# Patient Record
Sex: MALE | Race: WHITE | NOT HISPANIC OR LATINO | Employment: FULL TIME | URBAN - METROPOLITAN AREA
[De-identification: names, ages, dates, MRNs, and addresses within clinical notes are randomized per-mention and may not be internally consistent; named-entity substitution may affect disease eponyms.]

---

## 2020-06-18 DIAGNOSIS — F41.9 ANXIETY: Primary | ICD-10-CM

## 2020-06-18 RX ORDER — BUPROPION HYDROCHLORIDE 150 MG/1
150 TABLET, EXTENDED RELEASE ORAL 2 TIMES DAILY
Qty: 60 TABLET | Refills: 3 | Status: SHIPPED | OUTPATIENT
Start: 2020-06-18 | End: 2021-04-06 | Stop reason: SDUPTHER

## 2020-06-18 RX ORDER — BUPROPION HYDROCHLORIDE 150 MG/1
150 TABLET, EXTENDED RELEASE ORAL 2 TIMES DAILY
COMMUNITY
End: 2020-06-18 | Stop reason: SDUPTHER

## 2020-07-30 DIAGNOSIS — I10 ESSENTIAL HYPERTENSION: Primary | ICD-10-CM

## 2020-07-30 PROBLEM — R74.8 ELEVATED LIVER ENZYMES: Status: ACTIVE | Noted: 2020-07-30

## 2020-07-30 PROBLEM — J45.40 MODERATE PERSISTENT ASTHMA WITHOUT COMPLICATION: Status: ACTIVE | Noted: 2020-07-30

## 2020-07-30 PROBLEM — D45 POLYCYTHEMIA VERA (HCC): Status: ACTIVE | Noted: 2020-07-30

## 2020-07-30 PROBLEM — Z72.0 TOBACCO USER: Status: ACTIVE | Noted: 2020-07-30

## 2020-07-30 RX ORDER — IRBESARTAN 150 MG/1
TABLET ORAL
Qty: 30 TABLET | Refills: 3 | Status: SHIPPED | OUTPATIENT
Start: 2020-07-30 | End: 2020-12-29 | Stop reason: SDUPTHER

## 2020-12-29 DIAGNOSIS — I10 ESSENTIAL HYPERTENSION: ICD-10-CM

## 2020-12-29 RX ORDER — IRBESARTAN 150 MG/1
150 TABLET ORAL DAILY
Qty: 30 TABLET | Refills: 0 | Status: SHIPPED | OUTPATIENT
Start: 2020-12-29 | End: 2021-04-06 | Stop reason: SDUPTHER

## 2021-04-06 ENCOUNTER — OFFICE VISIT (OUTPATIENT)
Dept: FAMILY MEDICINE CLINIC | Facility: CLINIC | Age: 46
End: 2021-04-06
Payer: COMMERCIAL

## 2021-04-06 VITALS
WEIGHT: 289 LBS | OXYGEN SATURATION: 94 % | DIASTOLIC BLOOD PRESSURE: 82 MMHG | RESPIRATION RATE: 14 BRPM | BODY MASS INDEX: 39.14 KG/M2 | HEART RATE: 70 BPM | TEMPERATURE: 97.8 F | HEIGHT: 72 IN | SYSTOLIC BLOOD PRESSURE: 126 MMHG

## 2021-04-06 DIAGNOSIS — J45.40 MODERATE PERSISTENT ALLERGIC ASTHMA: ICD-10-CM

## 2021-04-06 DIAGNOSIS — Z72.0 TOBACCO USE: ICD-10-CM

## 2021-04-06 DIAGNOSIS — I10 ESSENTIAL HYPERTENSION: ICD-10-CM

## 2021-04-06 DIAGNOSIS — E66.01 CLASS 2 SEVERE OBESITY DUE TO EXCESS CALORIES WITH SERIOUS COMORBIDITY AND BODY MASS INDEX (BMI) OF 39.0 TO 39.9 IN ADULT (HCC): ICD-10-CM

## 2021-04-06 DIAGNOSIS — F41.9 ANXIETY: Primary | ICD-10-CM

## 2021-04-06 PROBLEM — E66.812 CLASS 2 SEVERE OBESITY DUE TO EXCESS CALORIES WITH SERIOUS COMORBIDITY AND BODY MASS INDEX (BMI) OF 39.0 TO 39.9 IN ADULT (HCC): Status: ACTIVE | Noted: 2021-04-06

## 2021-04-06 PROCEDURE — 3725F SCREEN DEPRESSION PERFORMED: CPT | Performed by: FAMILY MEDICINE

## 2021-04-06 PROCEDURE — 99214 OFFICE O/P EST MOD 30 MIN: CPT | Performed by: FAMILY MEDICINE

## 2021-04-06 PROCEDURE — 3008F BODY MASS INDEX DOCD: CPT | Performed by: FAMILY MEDICINE

## 2021-04-06 RX ORDER — ALBUTEROL SULFATE 90 UG/1
2 AEROSOL, METERED RESPIRATORY (INHALATION) EVERY 4 HOURS PRN
COMMUNITY
Start: 2004-06-10 | End: 2021-04-06 | Stop reason: SDUPTHER

## 2021-04-06 RX ORDER — BUPROPION HYDROCHLORIDE 150 MG/1
150 TABLET, EXTENDED RELEASE ORAL 2 TIMES DAILY
Qty: 60 TABLET | Refills: 1 | Status: SHIPPED | OUTPATIENT
Start: 2021-04-06 | End: 2021-07-12

## 2021-04-06 RX ORDER — IRBESARTAN 150 MG/1
150 TABLET ORAL DAILY
Qty: 30 TABLET | Refills: 1 | Status: SHIPPED | OUTPATIENT
Start: 2021-04-06 | End: 2021-06-09 | Stop reason: SDUPTHER

## 2021-04-06 RX ORDER — ALBUTEROL SULFATE 90 UG/1
2 AEROSOL, METERED RESPIRATORY (INHALATION) EVERY 4 HOURS PRN
Qty: 1 INHALER | Refills: 1 | Status: SHIPPED | OUTPATIENT
Start: 2021-04-06 | End: 2022-08-10 | Stop reason: SDUPTHER

## 2021-04-06 NOTE — PROGRESS NOTES
Subjective:      Patient ID: Veronica Graham is a 42 y o  male  Here for follow up of chronic conditions, not seen in 1 year, states he is on last pill for his ibresartan, when COVID-19 hit he stopped going to the doctors  Hypertension-chronic, controlled, compliant with [employer], continues to smoke 1 5 ppd, non compliant with diet or exercise  Anxiety- on wellbutrin, chronic, stable  Tobacco use- 1 5 ppd,tried to stop however has not been able to cut back, was taking wellbutrin once a day instead of twice a day and recently increased it to twice a day and is motivated to stop smoking  Moderate persistent asthma-chronic, did not start breo as that was 100$ and uses albuterol more frequently with onset of spring season, he is aware that smoking makes it worse, probably has COPD-never had spirometry      Past Medical History:   Diagnosis Date    Asthma     Hypertension        Family History   Problem Relation Age of Onset    Lung cancer Father     Diabetes Sister     COPD Sister        Past Surgical History:   Procedure Laterality Date    WISDOM TOOTH EXTRACTION          reports that he has been smoking  He started smoking about 22 years ago  He has a 30 00 pack-year smoking history  He has never used smokeless tobacco  He reports current alcohol use  He reports that he does not use drugs        Current Outpatient Medications:     albuterol (PROVENTIL HFA,VENTOLIN HFA) 90 mcg/act inhaler, Inhale 2 puffs every 4 (four) hours as needed for wheezing or shortness of breath, Disp: 1 Inhaler, Rfl: 1    buPROPion (WELLBUTRIN SR) 150 mg 12 hr tablet, Take 1 tablet (150 mg total) by mouth 2 (two) times a day, Disp: 60 tablet, Rfl: 1    irbesartan (AVAPRO) 150 mg tablet, Take 1 tablet (150 mg total) by mouth daily, Disp: 30 tablet, Rfl: 1    fluticasone-salmeterol (Advair Diskus) 250-50 mcg/dose inhaler, Inhale 1 puff 2 (two) times a day Rinse mouth after use , Disp: 3 Inhaler, Rfl: 3    The following portions of the patient's history were reviewed and updated as appropriate: allergies, current medications, past family history, past medical history, past social history, past surgical history and problem list     Review of Systems   Constitutional: Negative for activity change, chills, diaphoresis, fatigue and fever  HENT: Negative for congestion, ear discharge, ear pain, mouth sores, nosebleeds, postnasal drip, rhinorrhea, sinus pressure, sinus pain, sore throat, tinnitus and voice change  Eyes: Negative for photophobia, pain, discharge, redness and visual disturbance  Respiratory: Positive for wheezing  Negative for shortness of breath and stridor  Cardiovascular: Negative for chest pain and palpitations  Gastrointestinal: Negative for abdominal pain, blood in stool, constipation, diarrhea, nausea and vomiting  Endocrine: Negative for cold intolerance and heat intolerance  Musculoskeletal: Negative for arthralgias, back pain, joint swelling and myalgias  Skin: Negative for pallor and rash  Neurological: Negative for dizziness, tremors, seizures, syncope, speech difficulty, weakness and headaches  Psychiatric/Behavioral: Negative for behavioral problems, decreased concentration, hallucinations and suicidal ideas  The patient is not nervous/anxious  Objective:    /82 (BP Location: Left arm, Patient Position: Sitting, Cuff Size: Adult)   Pulse 70   Temp 97 8 °F (36 6 °C) (Temporal)   Resp 14   Ht 6' (1 829 m)   Wt 131 kg (289 lb)   SpO2 94%   BMI 39 20 kg/m²      Physical Exam  Vitals signs and nursing note reviewed  Constitutional:       Appearance: Normal appearance  He is well-developed  HENT:      Head: Normocephalic and atraumatic  Right Ear: External ear normal       Left Ear: External ear normal       Nose: Nose normal    Eyes:      Conjunctiva/sclera: Conjunctivae normal       Pupils: Pupils are equal, round, and reactive to light     Neck:      Musculoskeletal: Normal range of motion and neck supple  Cardiovascular:      Rate and Rhythm: Normal rate and regular rhythm  Heart sounds: Normal heart sounds  No murmur  No gallop  Pulmonary:      Effort: Pulmonary effort is normal  No respiratory distress  Breath sounds: Decreased air movement present  Examination of the right-upper field reveals decreased breath sounds  Examination of the left-upper field reveals decreased breath sounds  Examination of the right-middle field reveals decreased breath sounds  Examination of the left-middle field reveals decreased breath sounds  Examination of the right-lower field reveals decreased breath sounds  Examination of the left-lower field reveals decreased breath sounds  Decreased breath sounds present  No wheezing or rales  Abdominal:      General: There is no distension  Palpations: Abdomen is soft  Tenderness: There is no abdominal tenderness  Musculoskeletal:         General: No tenderness or deformity  Lymphadenopathy:      Cervical: No cervical adenopathy  Skin:     Coloration: Skin is not pale  Findings: No erythema or rash  Neurological:      General: No focal deficit present  Mental Status: He is alert and oriented to person, place, and time  Mental status is at baseline  Psychiatric:         Mood and Affect: Mood normal          Behavior: Behavior normal            No results found for this or any previous visit (from the past 1008 hour(s))  Assessment/Plan:       Diagnoses and all orders for this visit:    Anxiety  -     buPROPion (WELLBUTRIN SR) 150 mg 12 hr tablet; Take 1 tablet (150 mg total) by mouth 2 (two) times a day    Essential hypertension  -     irbesartan (AVAPRO) 150 mg tablet; Take 1 tablet (150 mg total) by mouth daily  -     CBC and differential; Future  -     Comprehensive metabolic panel; Future  -     Lipid panel; Future  -     TSH, 3rd generation with Free T4 reflex;  Future    Moderate persistent allergic asthma  -     albuterol (PROVENTIL HFA,VENTOLIN HFA) 90 mcg/act inhaler; Inhale 2 puffs every 4 (four) hours as needed for wheezing or shortness of breath  -     fluticasone-salmeterol (Advair Diskus) 250-50 mcg/dose inhaler; Inhale 1 puff 2 (two) times a day Rinse mouth after use  Tobacco use    Class 2 severe obesity due to excess calories with serious comorbidity and body mass index (BMI) of 39 0 to 39 9 in adult [facility])    Other orders  -     Discontinue: albuterol (PROVENTIL HFA,VENTOLIN HFA) 90 mcg/act inhaler; Inhale 2 puffs every 4 (four) hours as needed          BP today:  Vitals:    04/06/21 0832   BP: 126/82   Pulse: 70   Resp: 14   Temp: 97 8 °F (36 6 °C)   SpO2: 94%     Current blood pressure goal based on your age and co-morbidities is 150/90 mm Hg  Currently blood pressure is at goal    Continue taking your current medication,refils giver, check labs  Encouraged less than 2 g of sodium intake daily  Discussed red flag symptoms including intractable throbbing headache, visual changes, blurred vision, tingling numbness of any extremity, neurological signs like facial droop, slurred speech, chest pain  Call 9-1-1 and go to ER if these develop  Patient understood and verbalized understanding  Smoking cessation counseled, continue Wellbutrin  Start Advair daily, discussed rescue inhaler versus albuterol use  Smoking worsens asthma  F/u IN 3 MONTHS, consider spirometry  BMI Counseling: Body mass index is 39 2 kg/m²  The BMI is above normal  Nutrition recommendations include decreasing portion sizes, encouraging healthy choices of fruits and vegetables, decreasing fast food intake, consuming healthier snacks, limiting drinks that contain sugar, moderation in carbohydrate intake and reducing intake of cholesterol  Exercise recommendations include moderate physical activity 150 minutes/week  No pharmacotherapy was ordered           Patient was given opportunity to ask questions and all questions were answered

## 2021-06-09 DIAGNOSIS — I10 ESSENTIAL HYPERTENSION: ICD-10-CM

## 2021-06-09 RX ORDER — IRBESARTAN 150 MG/1
150 TABLET ORAL DAILY
Qty: 30 TABLET | Refills: 1 | Status: SHIPPED | OUTPATIENT
Start: 2021-06-09 | End: 2021-08-13

## 2021-07-04 PROBLEM — Z00.00 ENCOUNTER FOR ANNUAL PHYSICAL EXAM: Status: ACTIVE | Noted: 2021-07-04

## 2021-07-05 PROBLEM — D75.1 POLYCYTHEMIA: Status: ACTIVE | Noted: 2020-07-30

## 2021-07-11 DIAGNOSIS — F41.9 ANXIETY: ICD-10-CM

## 2021-07-12 RX ORDER — BUPROPION HYDROCHLORIDE 150 MG/1
TABLET, EXTENDED RELEASE ORAL
Qty: 60 TABLET | Refills: 1 | Status: SHIPPED | OUTPATIENT
Start: 2021-07-12 | End: 2021-11-19

## 2021-08-13 DIAGNOSIS — I10 ESSENTIAL HYPERTENSION: ICD-10-CM

## 2021-08-13 RX ORDER — IRBESARTAN 150 MG/1
TABLET ORAL
Qty: 30 TABLET | Refills: 1 | Status: SHIPPED | OUTPATIENT
Start: 2021-08-13 | End: 2021-10-18 | Stop reason: SDUPTHER

## 2021-10-18 ENCOUNTER — TELEPHONE (OUTPATIENT)
Dept: FAMILY MEDICINE CLINIC | Facility: CLINIC | Age: 46
End: 2021-10-18

## 2021-10-18 DIAGNOSIS — I10 ESSENTIAL HYPERTENSION: ICD-10-CM

## 2021-10-18 RX ORDER — IRBESARTAN 150 MG/1
150 TABLET ORAL DAILY
Qty: 30 TABLET | Refills: 1 | Status: SHIPPED | OUTPATIENT
Start: 2021-10-18 | End: 2021-12-21

## 2021-11-19 DIAGNOSIS — F41.9 ANXIETY: ICD-10-CM

## 2021-11-19 RX ORDER — BUPROPION HYDROCHLORIDE 150 MG/1
TABLET, EXTENDED RELEASE ORAL
Qty: 60 TABLET | Refills: 1 | Status: SHIPPED | OUTPATIENT
Start: 2021-11-19 | End: 2022-04-01

## 2021-12-22 ENCOUNTER — TELEPHONE (OUTPATIENT)
Dept: FAMILY MEDICINE CLINIC | Facility: CLINIC | Age: 46
End: 2021-12-22

## 2021-12-22 DIAGNOSIS — I10 ESSENTIAL HYPERTENSION: ICD-10-CM

## 2021-12-22 RX ORDER — IRBESARTAN 150 MG/1
150 TABLET ORAL DAILY
Qty: 30 TABLET | Refills: 3 | Status: SHIPPED | OUTPATIENT
Start: 2021-12-22 | End: 2022-07-07 | Stop reason: SDUPTHER

## 2022-04-01 DIAGNOSIS — F41.9 ANXIETY: ICD-10-CM

## 2022-04-01 RX ORDER — BUPROPION HYDROCHLORIDE 150 MG/1
TABLET, EXTENDED RELEASE ORAL
Qty: 60 TABLET | Refills: 1 | Status: SHIPPED | OUTPATIENT
Start: 2022-04-01

## 2022-05-09 ENCOUNTER — HOSPITAL ENCOUNTER (EMERGENCY)
Facility: HOSPITAL | Age: 47
Discharge: HOME | End: 2022-05-09
Attending: EMERGENCY MEDICINE
Payer: COMMERCIAL

## 2022-05-09 VITALS
HEART RATE: 64 BPM | WEIGHT: 300 LBS | BODY MASS INDEX: 40.63 KG/M2 | OXYGEN SATURATION: 97 % | DIASTOLIC BLOOD PRESSURE: 85 MMHG | SYSTOLIC BLOOD PRESSURE: 137 MMHG | HEIGHT: 72 IN | RESPIRATION RATE: 18 BRPM | TEMPERATURE: 98 F

## 2022-05-09 DIAGNOSIS — M54.9 MUSCULOSKELETAL BACK PAIN: Primary | ICD-10-CM

## 2022-05-09 LAB
ALBUMIN SERPL BCP-MCNC: 4.4 G/DL (ref 3.5–5)
ALP SERPL-CCNC: 41 U/L (ref 34–104)
ALT SERPL W P-5'-P-CCNC: 58 U/L (ref 7–52)
ANION GAP SERPL CALCULATED.3IONS-SCNC: 6 MMOL/L (ref 4–13)
AST SERPL W P-5'-P-CCNC: 29 U/L (ref 13–39)
BASOPHILS # BLD AUTO: 0.05 THOUSANDS/ΜL (ref 0–0.1)
BASOPHILS NFR BLD AUTO: 1 % (ref 0–1)
BILIRUB SERPL-MCNC: 1.02 MG/DL (ref 0.2–1)
BILIRUB UR QL STRIP: NEGATIVE
BUN SERPL-MCNC: 14 MG/DL (ref 5–25)
CALCIUM SERPL-MCNC: 9.1 MG/DL (ref 8.4–10.2)
CARDIAC TROPONIN I PNL SERPL HS: 5 NG/L
CHLORIDE SERPL-SCNC: 103 MMOL/L (ref 96–108)
CLARITY UR: CLEAR
CO2 SERPL-SCNC: 31 MMOL/L (ref 21–32)
COLOR UR: YELLOW
CREAT SERPL-MCNC: 0.87 MG/DL (ref 0.6–1.3)
EOSINOPHIL # BLD AUTO: 0.24 THOUSAND/ΜL (ref 0–0.61)
EOSINOPHIL NFR BLD AUTO: 3 % (ref 0–6)
ERYTHROCYTE [DISTWIDTH] IN BLOOD BY AUTOMATED COUNT: 13.1 % (ref 11.6–15.1)
GFR SERPL CREATININE-BSD FRML MDRD: 104 ML/MIN/1.73SQ M
GLUCOSE SERPL-MCNC: 103 MG/DL (ref 65–140)
GLUCOSE UR STRIP-MCNC: NEGATIVE MG/DL
HCT VFR BLD AUTO: 48.8 % (ref 36.5–49.3)
HGB BLD-MCNC: 17.7 G/DL (ref 12–17)
HGB UR QL STRIP.AUTO: NEGATIVE
IMM GRANULOCYTES # BLD AUTO: 0.03 THOUSAND/UL (ref 0–0.2)
IMM GRANULOCYTES NFR BLD AUTO: 0 % (ref 0–2)
KETONES UR STRIP-MCNC: NEGATIVE MG/DL
LEUKOCYTE ESTERASE UR QL STRIP: NEGATIVE
LYMPHOCYTES # BLD AUTO: 1.42 THOUSANDS/ΜL (ref 0.6–4.47)
LYMPHOCYTES NFR BLD AUTO: 18 % (ref 14–44)
MCH RBC QN AUTO: 33.6 PG (ref 26.8–34.3)
MCHC RBC AUTO-ENTMCNC: 36.3 G/DL (ref 31.4–37.4)
MCV RBC AUTO: 93 FL (ref 82–98)
MONOCYTES # BLD AUTO: 0.4 THOUSAND/ΜL (ref 0.17–1.22)
MONOCYTES NFR BLD AUTO: 5 % (ref 4–12)
NEUTROPHILS # BLD AUTO: 5.87 THOUSANDS/ΜL (ref 1.85–7.62)
NEUTS SEG NFR BLD AUTO: 73 % (ref 43–75)
NITRITE UR QL STRIP: NEGATIVE
NRBC BLD AUTO-RTO: 0 /100 WBCS
PH UR STRIP.AUTO: 7.5 [PH]
PLATELET # BLD AUTO: 186 THOUSANDS/UL (ref 149–390)
PMV BLD AUTO: 10.1 FL (ref 8.9–12.7)
POTASSIUM SERPL-SCNC: 4.4 MMOL/L (ref 3.5–5.3)
PROT SERPL-MCNC: 6.9 G/DL (ref 6.4–8.4)
PROT UR STRIP-MCNC: NEGATIVE MG/DL
RBC # BLD AUTO: 5.27 MILLION/UL (ref 3.88–5.62)
SODIUM SERPL-SCNC: 140 MMOL/L (ref 135–147)
SP GR UR STRIP.AUTO: 1.02 (ref 1–1.03)
UROBILINOGEN UR QL STRIP.AUTO: 1 E.U./DL
WBC # BLD AUTO: 8.01 THOUSAND/UL (ref 4.31–10.16)

## 2022-05-09 PROCEDURE — 84484 ASSAY OF TROPONIN QUANT: CPT | Performed by: PHYSICIAN ASSISTANT

## 2022-05-09 PROCEDURE — 93005 ELECTROCARDIOGRAM TRACING: CPT

## 2022-05-09 PROCEDURE — 85025 COMPLETE CBC W/AUTO DIFF WBC: CPT | Performed by: PHYSICIAN ASSISTANT

## 2022-05-09 PROCEDURE — 96365 THER/PROPH/DIAG IV INF INIT: CPT

## 2022-05-09 PROCEDURE — 36415 COLL VENOUS BLD VENIPUNCTURE: CPT | Performed by: PHYSICIAN ASSISTANT

## 2022-05-09 PROCEDURE — 96366 THER/PROPH/DIAG IV INF ADDON: CPT

## 2022-05-09 PROCEDURE — 99284 EMERGENCY DEPT VISIT MOD MDM: CPT

## 2022-05-09 PROCEDURE — 81003 URINALYSIS AUTO W/O SCOPE: CPT | Performed by: PHYSICIAN ASSISTANT

## 2022-05-09 PROCEDURE — 99285 EMERGENCY DEPT VISIT HI MDM: CPT | Performed by: PHYSICIAN ASSISTANT

## 2022-05-09 PROCEDURE — 80053 COMPREHEN METABOLIC PANEL: CPT | Performed by: PHYSICIAN ASSISTANT

## 2022-05-09 RX ORDER — METHOCARBAMOL 500 MG/1
500 TABLET, FILM COATED ORAL 4 TIMES DAILY
Qty: 20 TABLET | Refills: 0 | Status: SHIPPED | OUTPATIENT
Start: 2022-05-09

## 2022-05-09 RX ORDER — NAPROXEN 500 MG/1
500 TABLET ORAL 2 TIMES DAILY WITH MEALS
Qty: 30 TABLET | Refills: 0 | Status: SHIPPED | OUTPATIENT
Start: 2022-05-09

## 2022-05-09 RX ADMIN — SODIUM CHLORIDE, SODIUM LACTATE, POTASSIUM CHLORIDE, AND CALCIUM CHLORIDE 1000 ML: .6; .31; .03; .02 INJECTION, SOLUTION INTRAVENOUS at 12:01

## 2022-05-09 NOTE — DISCHARGE INSTRUCTIONS
Please return to the emergency department for worsening symptoms including chest pain, shortness of breath, dizziness, lightheadedness, fever greater than 103, severe pain, inability to walk, fainting episodes, etc  Please follow-up with your family practice provider as soon as possible  I have sent medications over to your pharmacy to your symptoms  Please take them as prescribed  The methocarbamol can occasionally make you drowsy; please do not take if your operating motor machinery or going to work

## 2022-05-09 NOTE — ED PROVIDER NOTES
History  Chief Complaint   Patient presents with    Flank Pain     pt presents to ed via walk in with left sided flank and back pain that started this AM denies any injury      This is a 79-year-old male with past medical history significant for hypertension, tobacco use, polycythemia, and asthma presenting to the emergency department today for left-sided flank pain that radiates to his left rib  He notes the pain was squeezing and constant  He notes the pain has since dissipated and it is not near as painful as it once was  Began earlier this morning  He denies any fever or chills  No chest pain or shortness of breath  No nausea, vomiting, diarrhea, constipation, abdominal pain, or urinary symptoms  No PE/VTE history  He notes the pain is worse with movement  He has no pleuritic chest pain or shortness of breath  The patient denies other complaints at this time  History provided by:  Patient   used: No    Flank Pain  Pain location:  L flank  Pain quality: squeezing    Pain radiation: left rib cage  Pain severity:  Moderate  Onset quality:  Sudden  Duration: since this AM   Timing:  Constant  Progression:  Resolved  Chronicity:  New  Context: not alcohol use, not eating, not previous surgeries, not recent travel, not sick contacts and not trauma    Relieved by:  None tried  Worsened by: Movement  Ineffective treatments:  None tried  Associated symptoms: no anorexia, no belching, no chest pain, no chills, no constipation, no cough, no diarrhea, no dysuria, no fatigue, no fever, no flatus, no hematuria, no melena, no nausea, no shortness of breath, no sore throat and no vomiting    Risk factors: obesity    Risk factors: has not had multiple surgeries, no NSAID use and not pregnant        Prior to Admission Medications   Prescriptions Last Dose Informant Patient Reported? Taking?    albuterol (PROVENTIL HFA,VENTOLIN HFA) 90 mcg/act inhaler Past Month at Unknown time  No Yes   Sig: Inhale 2 puffs every 4 (four) hours as needed for wheezing or shortness of breath   buPROPion (WELLBUTRIN SR) 150 mg 12 hr tablet 5/9/2022 at Unknown time  No Yes   Sig: take 1 tablet by mouth twice a day   irbesartan (AVAPRO) 150 mg tablet 5/9/2022 at Unknown time  No Yes   Sig: Take 1 tablet (150 mg total) by mouth daily      Facility-Administered Medications: None       Past Medical History:   Diagnosis Date    Asthma     Hypertension        Past Surgical History:   Procedure Laterality Date    WISDOM TOOTH EXTRACTION         Family History   Problem Relation Age of Onset    Lung cancer Father     Diabetes Sister     COPD Sister      I have reviewed and agree with the history as documented  E-Cigarette/Vaping    E-Cigarette Use Never User      E-Cigarette/Vaping Substances     Social History     Tobacco Use    Smoking status: Current Every Day Smoker     Packs/day: 1 50     Years: 20 00     Pack years: 30 00     Start date: 1999    Smokeless tobacco: Never Used   Vaping Use    Vaping Use: Never used   Substance Use Topics    Alcohol use: Yes     Comment: heavy     Drug use: Never       Review of Systems   Constitutional: Negative for appetite change, chills, diaphoresis, fatigue and fever  HENT: Negative for sore throat  Eyes: Negative for visual disturbance  Respiratory: Negative for cough, chest tightness, shortness of breath and wheezing  Cardiovascular: Negative for chest pain, palpitations and leg swelling  Gastrointestinal: Negative for abdominal pain, anorexia, constipation, diarrhea, flatus, melena, nausea and vomiting  Genitourinary: Positive for flank pain  Negative for dysuria, hematuria, testicular pain and urgency  Musculoskeletal: Positive for back pain (left-sided)  Negative for neck pain and neck stiffness  Skin: Negative for rash and wound  Neurological: Negative for dizziness, seizures, syncope, weakness, light-headedness, numbness and headaches  Psychiatric/Behavioral: Negative for confusion  All other systems reviewed and are negative  Physical Exam  Physical Exam  Vitals and nursing note reviewed  Constitutional:       General: He is not in acute distress  Appearance: Normal appearance  He is obese  He is not ill-appearing, toxic-appearing or diaphoretic  HENT:      Head: Normocephalic and atraumatic  Nose: Nose normal  No congestion or rhinorrhea  Mouth/Throat:      Mouth: Mucous membranes are moist       Pharynx: No oropharyngeal exudate or posterior oropharyngeal erythema  Eyes:      General: No scleral icterus  Right eye: No discharge  Left eye: No discharge  Conjunctiva/sclera: Conjunctivae normal    Neck:      Comments: Non meningeal  Cardiovascular:      Rate and Rhythm: Normal rate and regular rhythm  Pulses: Normal pulses  Heart sounds: Normal heart sounds  No murmur heard  No friction rub  No gallop  Comments: Regular rate rhythm with no murmurs, rubs, or gallops  Pulmonary:      Effort: Pulmonary effort is normal  No respiratory distress  Breath sounds: Normal breath sounds  No stridor  No wheezing, rhonchi or rales  Comments: Clear to auscultation bilaterally without adventitious breath sounds  Chest:      Chest wall: No tenderness  Abdominal:      General: Abdomen is flat  There is no distension  Palpations: Abdomen is soft  Tenderness: There is no abdominal tenderness  There is no right CVA tenderness, left CVA tenderness, guarding or rebound  Comments: Soft, nontender, nondistended, and without organomegaly  No CVA tenderness bilaterally   Musculoskeletal:         General: Normal range of motion  Cervical back: Normal range of motion  No rigidity  Right lower leg: No edema  Left lower leg: No edema  Comments:  The patient does have some tenderness to palpation to his thoracic paraspinal musculature; appears to have some semblance of muscle spasm associated with it  Normal range of motion of bilateral upper and lower extremities  No tenderness to palpation to midline cervical, thoracic, or lumbar spines; no step-offs or deformities   Skin:     General: Skin is warm and dry  Capillary Refill: Capillary refill takes less than 2 seconds  Coloration: Skin is not jaundiced or pale  Neurological:      General: No focal deficit present  Mental Status: He is alert and oriented to person, place, and time  Mental status is at baseline        Comments: 5/5 strength in bilateral upper and lower extremities  Normal sensation of bilateral upper and lower extremities   Psychiatric:         Mood and Affect: Mood normal          Behavior: Behavior normal          Vital Signs  ED Triage Vitals [05/09/22 1041]   Temperature Pulse Respirations Blood Pressure SpO2   98 °F (36 7 °C) 76 18 156/93 96 %      Temp Source Heart Rate Source Patient Position - Orthostatic VS BP Location FiO2 (%)   Oral Monitor Sitting Left arm --      Pain Score       3           Vitals:    05/09/22 1041 05/09/22 1340   BP: 156/93 137/85   Pulse: 76 64   Patient Position - Orthostatic VS: Sitting Lying         Visual Acuity      ED Medications  Medications   lactated ringers bolus 1,000 mL (0 mL Intravenous Stopped 5/9/22 1415)       Diagnostic Studies  Results Reviewed     Procedure Component Value Units Date/Time    UA w Reflex to Microscopic w Reflex to Culture [[id number]]  (Normal) Collected: 05/09/22 1348    Lab Status: Final result Specimen: Urine, Clean Catch Updated: 05/09/22 1355     Color, UA Yellow     Clarity, UA Clear     Specific [place], UA 1 020     pH, UA 7 5     Leukocytes, UA Negative     Nitrite, UA Negative     Protein, UA Negative mg/dl      Glucose, UA Negative mg/dl      Ketones, UA Negative mg/dl      Urobilinogen, UA 1 0 E U /dl      Bilirubin, UA Negative     Blood, UA Negative    HS Troponin 0hr (reflex protocol) [[id number]]  (Normal) Collected: 05/09/22 1152    Lab Status: Final result Specimen: Blood from Arm, Left Updated: 05/09/22 1220     hs TnI 0hr 5 ng/L     Comprehensive metabolic panel [[id number]]  (Abnormal) Collected: 05/09/22 1152    Lab Status: Final result Specimen: Blood from Arm, Left Updated: 05/09/22 1215     Sodium 140 mmol/L      Potassium 4 4 mmol/L      Chloride 103 mmol/L      CO2 31 mmol/L      ANION GAP 6 mmol/L      BUN 14 mg/dL      Creatinine 0 87 mg/dL      Glucose 103 mg/dL      Calcium 9 1 mg/dL      AST 29 U/L      ALT 58 U/L      Alkaline Phosphatase 41 U/L      Total Protein 6 9 g/dL      Albumin 4 4 g/dL      Total Bilirubin 1 02 mg/dL      eGFR 104 ml/min/1 73sq m     Narrative:      National Kidney Disease Foundation guidelines for Chronic Kidney Disease (CKD):     Stage 1 with normal or high GFR (GFR > 90 mL/min/1 73 square meters)    Stage 2 Mild CKD (GFR = 60-89 mL/min/1 73 square meters)    Stage 3A Moderate CKD (GFR = 45-59 mL/min/1 73 square meters)    Stage 3B Moderate CKD (GFR = 30-44 mL/min/1 73 square meters)    Stage 4 Severe CKD (GFR = 15-29 mL/min/1 73 square meters)    Stage 5 End Stage CKD (GFR <15 mL/min/1 73 square meters)  Note: GFR calculation is accurate only with a steady state creatinine    CBC and differential [[id number]]  (Abnormal) Collected: 05/09/22 1152    Lab Status: Final result Specimen: Blood from Arm, Left Updated: 05/09/22 1156     WBC 8 01 Thousand/uL      RBC 5 27 Million/uL      Hemoglobin 17 7 g/dL      Hematocrit 48 8 %      MCV 93 fL      MCH 33 6 pg      MCHC 36 3 g/dL      RDW 13 1 %      MPV 10 1 fL      Platelets 424 Thousands/uL      nRBC 0 /100 WBCs      Neutrophils Relative 73 %      Immat GRANS % 0 %      Lymphocytes Relative 18 %      Monocytes Relative 5 %      Eosinophils Relative 3 %      Basophils Relative 1 %      Neutrophils Absolute 5 87 Thousands/µL      Immature Grans Absolute 0 03 Thousand/uL      Lymphocytes Absolute 1 42 Thousands/µL Monocytes Absolute 0 40 Thousand/µL      Eosinophils Absolute 0 24 Thousand/µL      Basophils Absolute 0 05 Thousands/µL                  No orders to display              Procedures  ECG 12 Lead Documentation Only    Date/Time: 5/9/2022 11:52 AM  Performed by: Joss Tompkins PA-C  Authorized by: Joss Tompkins PA-C     Indications / Diagnosis:  Back Pain  Patient location:  ED  Interpretation:     Interpretation: normal    Rate:     ECG rate:  69    ECG rate assessment: normal    Rhythm:     Rhythm: sinus rhythm    Ectopy:     Ectopy: none    QRS:     QRS axis:  Left  ST segments:     ST segments:  Normal  T waves:     T waves: non-specific    Comments:      Normal sinus rhythm with a rate of 69 without any ST segment changes or signs of ischemia  Left axis deviation  No ectopy  Nonspecific T-wave inversions             ED Course  ED Course as of 05/09/22 1928   Mon May 09, 2022   Felix KEYS [place] 94 PERC and Wells both 0  Will defer D-Dimer  Will continue to monitor  1250 hs TnI 0hr: 5  Patient refusing further blood draws  Will DC additional troponin testing  I did discuss negative consequences with patient and he is able to tell them back to me and still wishes not to have further troponin testing  I believe he has capacity to make this decision  Will DC additional troponins  Will continue to monitor  1357 Workup is benign  Will DC on muscle relaxers  Will continue to monitor                         PERC Rule for PE      Most Recent Value   PERC Rule for PE    Age >=50 0 Filed at: 05/09/2022 1246   HR >=100 0 Filed at: 05/09/2022 1246   O2 Sat on room air < 95% 0 Filed at: 05/09/2022 1246   History of PE or DVT 0 Filed at: 05/09/2022 1246   Recent trauma or surgery 0 Filed at: 05/09/2022 1246   Hemoptysis 0 Filed at: 05/09/2022 1246   Exogenous estrogen 0 Filed at: 05/09/2022 1246   Unilateral leg swelling 0 Filed at: 05/09/2022 1246   [address] for PE Results 0 Filed at: 05/09/2022 1246              SBIRT 22yo+      Most Recent Value   SBIRT (22 yo +)    In order to provide better care to our patients, we are screening all of our patients for alcohol and drug use  Would it be okay to ask you these screening questions? No Filed at: 05/09/2022 1155          Wells' Criteria for PE      Most Recent Value   Wells' Criteria for PE    Clinical signs and symptoms of DVT 0 Filed at: 05/09/2022 1246   PE is primary diagnosis or equally likely 0 Filed at: 05/09/2022 1246   HR >100 0 Filed at: 05/09/2022 1246   Immobilization at least 3 days or Surgery in the previous 4 weeks 0 Filed at: 05/09/2022 1246   Previous, objectively diagnosed PE or DVT 0 Filed at: 05/09/2022 1246   Hemoptysis 0 Filed at: 05/09/2022 1246   Malignancy with treatment within 6 months or palliative 0 Filed at: 05/09/2022 1246   Wells' Criteria Total 0 Filed at: 05/09/2022 1246                MDM  Number of Diagnoses or Management Options  Musculoskeletal back pain: new and requires workup  Diagnosis management comments: This is a 72-year-old male presenting to the emergency department today for left-sided flank pain and back pain  Ongoing since this morning but the patient notes it is much improved now that he is here in the emergency department  He has no chest pain or shortness of breath  He has no fever or chills  No pleuritic chest pain  No urinary symptoms  Perc is 0  Lurene Xenia is also 0  No indication for D-dimer at this time given low probability of PE  Lab workup grossly within normal limits  Initial troponin was 5 and patient is refusing additional troponin testing  EKG shows normal sinus rhythm with a rate of 69 without any ST segment changes or signs of ischemia  There is evidence of nonspecific T-wave changes  Negative UA  On physical examination, the patient does have thoracic paraspinal musculature tenderness to palpation on the left; I suspect musculoskeletal origin for the patient's pain in the setting of benign lab workup    The patient is stable for discharge at this time  Strict return precautions were given  Robaxin and naproxen sent to the patient's pharmacy  The patient is aware not to drive or go to work while taking Robaxin as it can make him drowsy  Strict return precautions were given  Recommend PCP follow-up as soon as possible  The patient and/or patient's proxy verify their understanding and agree to the plan at this time  All questions answered to the patient and/or their proxy's satisfaction  All labs reviewed and utilized in the medical decision making process  All radiology studies independently viewed by me and interpreted by the radiologist  Portions of the record may have been created with voice recognition software   Occasional wrong word or "sound a like" substitutions may have occurred due to the inherent limitations of voice recognition software   Read the chart carefully and recognize, using context, where substitutions have occurred  Amount and/or Complexity of Data Reviewed  Clinical lab tests: ordered and reviewed  Tests in the radiology section of CPT®: ordered and reviewed  Independent visualization of images, tracings, or specimens: yes (EKG)    Patient Progress  Patient progress: stable      Disposition  Final diagnoses:   Musculoskeletal back pain     Time reflects when diagnosis was documented in both MDM as applicable and the Disposition within this note     Time User Action Codes Description Comment    5/9/2022  2:03 PM Tyesha Wilson Add [M54 9] Musculoskeletal back pain       ED Disposition     ED Disposition Condition Date/Time Comment    Discharge Stable Mon May 9, 2022 [address] discharge to home/self care              Follow-up Information     Follow up With Specialties Details Why Contact Info Additional Information    Joanie Pagan MD Family Medicine Schedule an appointment as soon as possible for a visit   [address]  [place] [address] Emergency Department Emergency Medicine Go to  If symptoms worsen [address] [id number]  [facility] Emergency Department, [address] [place], [address]          Discharge Medication List as of 5/9/2022  2:06 PM      START taking these medications    Details   methocarbamol (ROBAXIN) 500 mg tablet Take 1 tablet (500 mg total) by mouth 4 (four) times a day, Starting Mon 5/9/2022, Normal      naproxen (Naprosyn) 500 mg tablet Take 1 tablet (500 mg total) by mouth 2 (two) times a day with meals, Starting Mon 5/9/2022, Normal         CONTINUE these medications which have NOT CHANGED    Details   albuterol (PROVENTIL HFA,VENTOLIN HFA) 90 mcg/act inhaler Inhale 2 puffs every 4 (four) hours as needed for wheezing or shortness of breath, Starting Tue 4/6/2021, Normal      buPROPion (WELLBUTRIN SR) 150 mg 12 hr tablet take 1 tablet by mouth twice a day, Normal      irbesartan (AVAPRO) 150 mg tablet Take 1 tablet (150 mg total) by mouth daily, Starting Wed 12/22/2021, Normal             No discharge procedures on file      PDMP Review     None          ED Provider  Electronically Signed by           Jeannette Lizarraga PA-C  05/09/22 1941

## 2022-05-09 NOTE — Clinical Note
Haydee Siegel was seen and treated in our emergency department on 5/9/2022  Diagnosis:     Xiomara Rodriguez  is off the rest of the shift today  He may return on this date: The patient is to perform light duty on May 10, 2022 and May 11, 2022  Please call questions or concerns  If you have any questions or concerns, please don't hesitate to call        Jennifer Patton PA-C    ______________________________           _______________          _______________  Hospital Representative                              Date                                Time

## 2022-05-11 LAB
ATRIAL RATE: 68 BPM
P AXIS: 50 DEGREES
PR INTERVAL: 157 MS
QRS AXIS: -86 DEGREES
QRSD INTERVAL: 103 MS
QT INTERVAL: 397 MS
QTC INTERVAL: 426 MS
T WAVE AXIS: 29 DEGREES
VENTRICULAR RATE: 69 BPM

## 2022-05-11 PROCEDURE — 93010 ELECTROCARDIOGRAM REPORT: CPT | Performed by: INTERNAL MEDICINE

## 2022-07-07 ENCOUNTER — TELEPHONE (OUTPATIENT)
Dept: FAMILY MEDICINE CLINIC | Facility: CLINIC | Age: 47
End: 2022-07-07

## 2022-07-07 DIAGNOSIS — I10 ESSENTIAL HYPERTENSION: ICD-10-CM

## 2022-07-07 RX ORDER — IRBESARTAN 150 MG/1
150 TABLET ORAL DAILY
Qty: 30 TABLET | Refills: 1 | Status: SHIPPED | OUTPATIENT
Start: 2022-07-07 | End: 2022-08-10 | Stop reason: SDUPTHER

## 2022-07-07 NOTE — TELEPHONE ENCOUNTER
Needs a refill for:  Irbesartan 150mg, 1x aday    (30 day)    [address]      Patient ph # [phone]

## 2022-08-03 ENCOUNTER — RA CDI HCC (OUTPATIENT)
Dept: OTHER | Facility: HOSPITAL | Age: 47
End: 2022-08-03

## 2022-08-03 NOTE — PROGRESS NOTES
[facility]  coding opportunities          Chart Reviewed number of suggestions sent to Provider: 2   [id number] [phone]  [id number]    Patients Insurance        Commercial Insurance: [employer]

## 2022-08-10 ENCOUNTER — OFFICE VISIT (OUTPATIENT)
Dept: FAMILY MEDICINE CLINIC | Facility: CLINIC | Age: 47
End: 2022-08-10
Payer: COMMERCIAL

## 2022-08-10 VITALS
TEMPERATURE: 97.9 F | BODY MASS INDEX: 40.23 KG/M2 | WEIGHT: 297 LBS | OXYGEN SATURATION: 94 % | DIASTOLIC BLOOD PRESSURE: 84 MMHG | HEART RATE: 80 BPM | HEIGHT: 72 IN | RESPIRATION RATE: 18 BRPM | SYSTOLIC BLOOD PRESSURE: 124 MMHG

## 2022-08-10 DIAGNOSIS — Z12.11 SCREENING FOR COLON CANCER: ICD-10-CM

## 2022-08-10 DIAGNOSIS — Z00.00 ENCOUNTER FOR ANNUAL PHYSICAL EXAM: Primary | ICD-10-CM

## 2022-08-10 DIAGNOSIS — L91.8 SKIN TAGS, MULTIPLE ACQUIRED: ICD-10-CM

## 2022-08-10 DIAGNOSIS — E66.01 MORBID OBESITY DUE TO EXCESS CALORIES (HCC): ICD-10-CM

## 2022-08-10 DIAGNOSIS — D75.1 POLYCYTHEMIA: ICD-10-CM

## 2022-08-10 DIAGNOSIS — Z72.0 TOBACCO USE: ICD-10-CM

## 2022-08-10 DIAGNOSIS — J45.40 MODERATE PERSISTENT ALLERGIC ASTHMA: ICD-10-CM

## 2022-08-10 DIAGNOSIS — I10 ESSENTIAL HYPERTENSION: ICD-10-CM

## 2022-08-10 DIAGNOSIS — Z13.6 SCREENING FOR CARDIOVASCULAR CONDITION: ICD-10-CM

## 2022-08-10 PROCEDURE — 3725F SCREEN DEPRESSION PERFORMED: CPT | Performed by: FAMILY MEDICINE

## 2022-08-10 PROCEDURE — 99396 PREV VISIT EST AGE 40-64: CPT | Performed by: FAMILY MEDICINE

## 2022-08-10 PROCEDURE — 99213 OFFICE O/P EST LOW 20 MIN: CPT | Performed by: FAMILY MEDICINE

## 2022-08-10 RX ORDER — IRBESARTAN 150 MG/1
150 TABLET ORAL DAILY
Qty: 90 TABLET | Refills: 2 | Status: SHIPPED | OUTPATIENT
Start: 2022-08-10

## 2022-08-10 RX ORDER — ALBUTEROL SULFATE 90 UG/1
2 AEROSOL, METERED RESPIRATORY (INHALATION) EVERY 4 HOURS PRN
Qty: 18 G | Refills: 3 | Status: SHIPPED | OUTPATIENT
Start: 2022-08-10

## 2022-08-10 NOTE — PATIENT INSTRUCTIONS
Wellness Visit for Adults   AMBULATORY CARE:   A wellness visit  is when you see your healthcare provider to get screened for health problems  Your healthcare provider will also give you advice on how to stay healthy  Write down your questions so you remember to ask them  Ask your healthcare provider how often you should have a wellness visit  What happens at a wellness visit:  Your healthcare provider will ask about your health, and your family history of health problems  This includes high blood pressure, heart disease, and cancer  He or she will ask if you have symptoms that concern you, if you smoke, and about your mood  You may also be asked about your intake of medicines, supplements, food, and alcohol  Any of the following may be done: Your weight  will be checked  Your height may also be checked so your body mass index (BMI) can be calculated  Your BMI shows if you are at a healthy weight  Your blood pressure  and heart rate will be checked  Your temperature may also be checked  Blood and urine tests  may be done  Blood tests may be done to check your cholesterol levels  Abnormal cholesterol levels increase your risk for heart disease and stroke  You may also need a blood or urine test to check for diabetes if you are at increased risk  Urine tests may be done to look for signs of an infection or kidney disease  A physical exam  includes checking your heartbeat and lungs with a stethoscope  Your healthcare provider may also check your skin to look for sun damage  Screening tests  may be recommended  A screening test is done to check for diseases that may not cause symptoms  The screening tests you may need depend on your age, gender, family history, and lifestyle habits  For example, colorectal screening may be recommended if you are 48years old or older  Screening tests you need if you are a woman:   A Pap smear  is used to screen for cervical cancer   Pap smears are usually done every 3 to 5 years depending on your age  You may need them more often if you have had abnormal Pap smear test results in the past  Ask your healthcare provider how often you should have a Pap smear  A mammogram  is an x-ray of your breasts to screen for breast cancer  Experts recommend mammograms every 2 years starting at age 48 years  You may need a mammogram at age 52 years or younger if you have an increased risk for breast cancer  Talk to your healthcare provider about when you should start having mammograms and how often you need them  Vaccines you may need:   Get an influenza vaccine  every year  The influenza vaccine protects you from the flu  Several types of viruses cause the flu  The viruses change over time, so new vaccines are made each year  Get a tetanus-diphtheria (Td) booster vaccine  every 10 years  This vaccine protects you against tetanus and diphtheria  Tetanus is a severe infection that may cause painful muscle spasms and lockjaw  Diphtheria is a severe bacterial infection that causes a thick covering in the back of your mouth and throat  Get a human papillomavirus (HPV) vaccine  if you are female and aged 23 to 32 or male 23 to 24 and never received it  This vaccine protects you from HPV infection  HPV is the most common infection spread by sexual contact  HPV may also cause vaginal, penile, and anal cancers  Get a pneumococcal vaccine  if you are aged 72 years or older  The pneumococcal vaccine is an injection given to protect you from pneumococcal disease  Pneumococcal disease is an infection caused by pneumococcal bacteria  The infection may cause pneumonia, meningitis, or an ear infection  Get a shingles vaccine  if you are 60 or older, even if you have had shingles before  The shingles vaccine is an injection to protect you from the varicella-zoster virus  This is the same virus that causes chickenpox   Shingles is a painful rash that develops in people who had chickenpox or have been exposed to the virus  How to eat healthy:  My Plate is a model for planning healthy meals  It shows the types and amounts of foods that should go on your plate  Fruits and vegetables make up about half of your plate, and grains and protein make up the other half  A serving of dairy is included on the side of your plate  The amount of calories and serving sizes you need depends on your age, gender, weight, and height  Examples of healthy foods are listed below:  Eat a variety of vegetables  such as dark green, red, and orange vegetables  You can also include canned vegetables low in sodium (salt) and frozen vegetables without added butter or sauces  Eat a variety of fresh fruits , canned fruit in 100% juice, frozen fruit, and dried fruit  Include whole grains  At least half of the grains you eat should be whole grains  Examples include whole-wheat bread, wheat pasta, brown rice, and whole-grain cereals such as oatmeal     Eat a variety of protein foods such as seafood (fish and shellfish), lean meat, and poultry without skin (turkey and chicken)  Examples of lean meats include pork leg, shoulder, or tenderloin, and beef round, sirloin, tenderloin, and extra lean ground beef  Other protein foods include eggs and egg substitutes, beans, peas, soy products, nuts, and seeds  Choose low-fat dairy products such as skim or 1% milk or low-fat yogurt, cheese, and cottage cheese  Limit unhealthy fats  such as butter, hard margarine, and shortening  Exercise:  Exercise at least 30 minutes per day on most days of the week  Some examples of exercise include walking, biking, dancing, and swimming  You can also fit in more physical activity by taking the stairs instead of the elevator or parking farther away from stores  Include muscle strengthening activities 2 days each week  Regular exercise provides many health benefits   It helps you manage your weight, and decreases your risk for type 2 diabetes, heart disease, stroke, and high blood pressure  Exercise can also help improve your mood  Ask your healthcare provider about the best exercise plan for you  General health and safety guidelines:   Do not smoke  Nicotine and other chemicals in cigarettes and cigars can cause lung damage  Ask your healthcare provider for information if you currently smoke and need help to quit  E-cigarettes or smokeless tobacco still contain nicotine  Talk to your healthcare provider before you use these products  Limit alcohol  A drink of alcohol is 12 ounces of beer, 5 ounces of wine, or 1½ ounces of liquor  Lose weight, if needed  Being overweight increases your risk of certain health conditions  These include heart disease, high blood pressure, type 2 diabetes, and certain types of cancer  Protect your skin  Do not sunbathe or use tanning beds  Use sunscreen with a SPF 15 or higher  Apply sunscreen at least 15 minutes before you go outside  Reapply sunscreen every 2 hours  Wear protective clothing, hats, and sunglasses when you are outside  Drive safely  Always wear your seatbelt  Make sure everyone in your car wears a seatbelt  A seatbelt can save your life if you are in an accident  Do not use your cell phone when you are driving  This could distract you and cause an accident  Pull over if you need to make a call or send a text message  Practice safe sex  Use latex condoms if are sexually active and have more than one partner  Your healthcare provider may recommend screening tests for sexually transmitted infections (STIs)  Wear helmets, lifejackets, and protective gear  Always wear a helmet when you ride a bike or motorcycle, go skiing, or play sports that could cause a head injury  Wear protective equipment when you play sports  Wear a lifejacket when you are on a boat or doing water sports      © Copyright [employer] 2022 Information is for End User's use only and may not be sold, redistributed or otherwise used for commercial purposes  All illustrations and images included in CareNotes® are the copyrighted property of A D A M , Inc  or Saskia Jaramillo  The above information is an  only  It is not intended as medical advice for individual conditions or treatments  Talk to your doctor, nurse or pharmacist before following any medical regimen to see if it is safe and effective for you

## 2022-08-10 NOTE — ASSESSMENT & PLAN NOTE
CPE done  Pt to check Tdap status  Declined COVID vaccines  Will check lipids and cologuard  Pt advised to follow a well balanced, heart healthy diet and to exercise on a regular basis  Pt told to decrease ETOH and tob use

## 2022-08-10 NOTE — ASSESSMENT & PLAN NOTE
BP good  Continue irbesartan 150 mg qd  Pt advised to continue low Na diet and to exercise on a regular basis

## 2022-08-10 NOTE — PROGRESS NOTES
BMI Counseling: Body mass index is 40 28 kg/m²  The BMI is above normal  Nutrition recommendations include decreasing portion sizes, encouraging healthy choices of fruits and vegetables, consuming healthier snacks and moderation in carbohydrate intake  Exercise recommendations include exercising 3-5 times per week  No pharmacotherapy was ordered  Rationale for BMI follow-up plan is due to patient being overweight or obese  Depression Screening and Follow-up Plan: Patient was screened for depression during today's encounter  They screened negative with a PHQ-2 score of 0  Assessment/Plan:         Problem List Items Addressed This Visit        Respiratory    Moderate persistent allergic asthma    Relevant Medications    albuterol (PROVENTIL HFA,VENTOLIN HFA) 90 mcg/act inhaler       Cardiovascular and Mediastinum    Essential hypertension     BP good  Continue irbesartan 150 mg qd  Pt advised to continue low Na diet and to exercise on a regular basis  Relevant Medications    irbesartan (AVAPRO) 150 mg tablet       Musculoskeletal and Integument    Skin tags, multiple acquired     Will refer to Derm  Relevant Orders    Ambulatory Referral to Dermatology       Other    Tobacco use     Pt smokes 1 5 PPD  Discussed quitting and chantix  Pt will consider  Polycythemia     Hgb 17 7 in May 2022 and likely due to smoking  Morbid obesity due to excess calories (HCC)     Discussed smaller portions, healthy snacks, and regular exercise  Encounter for annual physical exam - Primary     CPE done  Pt to check Tdap status  Declined COVID vaccines  Will check lipids and cologuard  Pt advised to follow a well balanced, heart healthy diet and to exercise on a regular basis  Pt told to decrease ETOH and tob use              Other Visit Diagnoses     Screening for cardiovascular condition        Relevant Orders    Lipid panel    Screening for colon cancer        Relevant Orders Tara            Subjective:      Patient ID: Maryse Montesinos is a 41 y o  male  Pt here for physical  Doing ok  No cp/sob  No headaches  No abd pain  BP up at times  No regular exercise  Smokes 1 5 PPD  Drinks ETOH on regular basis  Unsure of last Tdap  Hasn't had COVID vaccines  The following portions of the patient's history were reviewed and updated as appropriate:   Past Medical History:  He has a past medical history of Asthma and Hypertension  ,  _______________________________________________________________________  Medical Problems:  does not have any pertinent problems on file ,  _______________________________________________________________________  Past Surgical History:   has a past surgical history that includes [place] tooth extraction  ,  _______________________________________________________________________  Family History:  family history includes COPD in his sister; Diabetes in his sister; Lung cancer in his father ,  _______________________________________________________________________  Social History:   reports that he has been smoking  He started smoking about 23 years ago  He has a 30 00 pack-year smoking history  He has never used smokeless tobacco  He reports current alcohol use  He reports that he does not use drugs  ,  _______________________________________________________________________  Allergies:  has No Known Allergies     _______________________________________________________________________  Current Outpatient Medications   Medication Sig Dispense Refill    albuterol (PROVENTIL HFA,VENTOLIN HFA) 90 mcg/act inhaler Inhale 2 puffs every 4 (four) hours as needed for wheezing or shortness of breath 18 g 3    irbesartan (AVAPRO) 150 mg tablet Take 1 tablet (150 mg total) by mouth daily 90 tablet 2    buPROPion (WELLBUTRIN SR) 150 mg 12 hr tablet take 1 tablet by mouth twice a day (Patient not taking: Reported on 8/10/2022) 60 tablet 1    methocarbamol (ROBAXIN) 500 mg tablet Take 1 tablet (500 mg total) by mouth 4 (four) times a day (Patient not taking: Reported on 8/10/2022) 20 tablet 0    naproxen (Naprosyn) 500 mg tablet Take 1 tablet (500 mg total) by mouth 2 (two) times a day with meals (Patient not taking: Reported on 8/10/2022) 30 tablet 0     No current facility-administered medications for this visit      _______________________________________________________________________  Review of Systems   Constitutional: Negative for activity change, appetite change, fatigue and unexpected weight change  HENT: Negative for congestion, ear pain, rhinorrhea, sore throat and trouble swallowing  Eyes: Negative for pain, discharge and visual disturbance  Respiratory: Negative for cough, shortness of breath and wheezing  Cardiovascular: Negative for chest pain, palpitations and leg swelling  Gastrointestinal: Negative for abdominal pain, constipation, diarrhea, nausea and vomiting  Endocrine: Negative for polydipsia, polyphagia and polyuria  Genitourinary: Negative for difficulty urinating and hematuria  Musculoskeletal: Negative for arthralgias, back pain, gait problem, myalgias and neck pain  Skin: Negative for color change and rash  Neurological: Negative for dizziness, weakness and headaches  Hematological: Negative for adenopathy  Does not bruise/bleed easily  Psychiatric/Behavioral: Negative for dysphoric mood and sleep disturbance  The patient is not nervous/anxious  Objective:  Vitals:    08/10/22 0908 08/10/22 0937   BP: 134/80 124/84   BP Location: Left arm Left arm   Patient Position: Sitting Sitting   Cuff Size: Large Large   Pulse: 80    Resp: 18    Temp: 97 9 °F (36 6 °C)    TempSrc: Temporal    SpO2: 94%    Weight: 135 kg (297 lb)    Height: 6' (1 829 m)      Body mass index is 40 28 kg/m²  Physical Exam  Vitals and nursing note reviewed  Constitutional:       Appearance: Normal appearance  He is well-developed  He is obese  HENT:      Head: Normocephalic and atraumatic  Right Ear: Tympanic membrane, ear canal and external ear normal       Left Ear: Tympanic membrane, ear canal and external ear normal       Nose: Nose normal       Mouth/Throat:      Mouth: Mucous membranes are moist       Pharynx: Oropharynx is clear  No posterior oropharyngeal erythema  Eyes:      Conjunctiva/sclera: Conjunctivae normal       Pupils: Pupils are equal, round, and reactive to light  Neck:      Thyroid: No thyromegaly  Cardiovascular:      Rate and Rhythm: Normal rate and regular rhythm  Heart sounds: Normal heart sounds  No murmur heard  Pulmonary:      Effort: Pulmonary effort is normal       Breath sounds: Normal breath sounds  No wheezing or rales  Abdominal:      General: Bowel sounds are normal  There is no distension  Palpations: Abdomen is soft  There is no mass  Tenderness: There is no abdominal tenderness  Musculoskeletal:         General: No deformity  Normal range of motion  Cervical back: Normal range of motion and neck supple  Right lower leg: No edema  Left lower leg: No edema  Lymphadenopathy:      Cervical: No cervical adenopathy  Skin:     General: Skin is warm and dry  Capillary Refill: Capillary refill takes less than 2 seconds  Findings: No erythema or rash  Comments: Skin tags   Neurological:      General: No focal deficit present  Mental Status: He is alert and oriented to person, place, and time  Mental status is at baseline  Cranial Nerves: No cranial nerve deficit  Sensory: No sensory deficit  Motor: No abnormal muscle tone  Coordination: Coordination normal    Psychiatric:         Mood and Affect: Mood normal          Behavior: Behavior normal          Thought Content:  Thought content normal          Judgment: Judgment normal

## 2022-10-10 ENCOUNTER — TELEPHONE (OUTPATIENT)
Dept: FAMILY MEDICINE CLINIC | Facility: CLINIC | Age: 47
End: 2022-10-10

## 2022-10-10 DIAGNOSIS — F41.9 ANXIETY: ICD-10-CM

## 2022-10-10 RX ORDER — BUPROPION HYDROCHLORIDE 150 MG/1
150 TABLET, EXTENDED RELEASE ORAL 2 TIMES DAILY
Qty: 60 TABLET | Refills: 3 | Status: SHIPPED | OUTPATIENT
Start: 2022-10-10

## 2022-10-10 NOTE — TELEPHONE ENCOUNTER
buPROPion (WELLBUTRIN SR) 150 mg 12 hr tablet take 1 tablet by mouth twice a day     Patient states he had stop taking this med because of his high BP, but says he needs it for his depression      [address]

## 2023-01-11 ENCOUNTER — TELEPHONE (OUTPATIENT)
Dept: FAMILY MEDICINE CLINIC | Facility: CLINIC | Age: 48
End: 2023-01-11

## 2023-01-11 DIAGNOSIS — J45.40 MODERATE PERSISTENT ALLERGIC ASTHMA: ICD-10-CM

## 2023-01-11 RX ORDER — ALBUTEROL SULFATE 90 UG/1
2 AEROSOL, METERED RESPIRATORY (INHALATION) EVERY 4 HOURS PRN
Qty: 18 G | Refills: 3 | Status: SHIPPED | OUTPATIENT
Start: 2023-01-11

## 2023-01-12 ENCOUNTER — TELEPHONE (OUTPATIENT)
Dept: FAMILY MEDICINE CLINIC | Facility: CLINIC | Age: 48
End: 2023-01-12

## 2023-01-12 DIAGNOSIS — J45.40 MODERATE PERSISTENT ALLERGIC ASTHMA: Primary | ICD-10-CM

## 2023-01-12 RX ORDER — ALBUTEROL SULFATE 1.25 MG/3ML
1.25 SOLUTION RESPIRATORY (INHALATION) EVERY 6 HOURS PRN
COMMUNITY
End: 2023-01-12 | Stop reason: SDUPTHER

## 2023-01-12 RX ORDER — ALBUTEROL SULFATE 1.25 MG/3ML
1.25 SOLUTION RESPIRATORY (INHALATION) EVERY 6 HOURS PRN
Qty: 75 ML | Refills: 1 | Status: SHIPPED | OUTPATIENT
Start: 2023-01-12

## 2023-01-12 NOTE — TELEPHONE ENCOUNTER
Called yesterday and asked for the nebulizer solution to be sent over  The inhaler was sent over instead  He's at the pharmacy now can we put in the albuterol solution?

## 2023-01-18 ENCOUNTER — RA CDI HCC (OUTPATIENT)
Dept: OTHER | Facility: HOSPITAL | Age: 48
End: 2023-01-18

## 2023-01-18 NOTE — PROGRESS NOTES
[facility]  coding opportunities          Chart Reviewed number of suggestions sent to Provider: 2   [phone]  [phone]    Patients Insurance        Commercial Insurance: [employer]

## 2023-01-23 ENCOUNTER — TELEPHONE (OUTPATIENT)
Dept: ADMINISTRATIVE | Facility: OTHER | Age: 48
End: 2023-01-23

## 2023-01-23 NOTE — TELEPHONE ENCOUNTER
01/23/23 12:25 PM    The patient was called and a message was left to call the ordering provider's office regarding an open order  Thank you    GLEN BOWMAN  [facility]

## 2023-01-24 DIAGNOSIS — Z00.00 ROUTINE ADULT HEALTH MAINTENANCE: Primary | ICD-10-CM

## 2023-01-24 NOTE — TELEPHONE ENCOUNTER
Spoke with patient and he got sick so he never did the cologuard  He not sure if the kit he has is  can we send over a new order to Bret?

## 2023-05-09 ENCOUNTER — TELEPHONE (OUTPATIENT)
Dept: FAMILY MEDICINE CLINIC | Facility: CLINIC | Age: 48
End: 2023-05-09

## 2023-05-09 NOTE — TELEPHONE ENCOUNTER
Ok to reorder cologard for pt  It was not reordered yet and there are no notes saying that he needs it to be reordered before today

## 2023-05-09 NOTE — TELEPHONE ENCOUNTER
Peterson Ryan called says his Cologuard that was ordered in August   He was told it will be reordered  Peterson Ryan says he hasn't received it and wants to know what's the status on that      Please call Peterson Ryan @ [phone]

## 2023-05-10 DIAGNOSIS — Z12.11 SCREENING FOR COLON CANCER: Primary | ICD-10-CM

## 2023-05-10 DIAGNOSIS — I10 ESSENTIAL HYPERTENSION: ICD-10-CM

## 2023-05-10 RX ORDER — IRBESARTAN 150 MG/1
150 TABLET ORAL DAILY
Qty: 90 TABLET | Refills: 2 | Status: SHIPPED | OUTPATIENT
Start: 2023-05-10

## 2023-10-17 DIAGNOSIS — I10 ESSENTIAL HYPERTENSION: ICD-10-CM

## 2023-10-17 DIAGNOSIS — J45.40 MODERATE PERSISTENT ALLERGIC ASTHMA: ICD-10-CM

## 2023-10-17 RX ORDER — IRBESARTAN 150 MG/1
150 TABLET ORAL DAILY
Qty: 30 TABLET | Refills: 0 | Status: SHIPPED | OUTPATIENT
Start: 2023-10-17

## 2023-10-17 RX ORDER — ALBUTEROL SULFATE 1.25 MG/3ML
1.25 SOLUTION RESPIRATORY (INHALATION) EVERY 6 HOURS PRN
Qty: 75 ML | Refills: 0 | Status: SHIPPED | OUTPATIENT
Start: 2023-10-17

## 2023-10-17 RX ORDER — ALBUTEROL SULFATE 90 UG/1
2 AEROSOL, METERED RESPIRATORY (INHALATION) EVERY 4 HOURS PRN
Qty: 18 G | Refills: 0 | Status: SHIPPED | OUTPATIENT
Start: 2023-10-17

## 2023-10-17 NOTE — TELEPHONE ENCOUNTER
Reason for call:   [x] Refill   [] Prior Auth  [x] Other: Patients Pharmacy closed and needs these refills sent to new Pharmacy [address]    Office:   [x] PCP/Provider - shane  [] Specialty/Provider -     Medication: Albuterol Hfa Inhaler 2 puffs every 4 hours prn qty 1                     Albuterol Nebulizer sol 3ml every 6 hours prn  qty 75ml                     Irbesartan 150mg 1 tab daily qty 80      Pharmacy:[address] ([place])    Does the patient have enough for 3 days?    [x] Yes   [] No - Send as HP to POD

## 2023-12-14 DIAGNOSIS — Z13.6 SCREENING FOR CARDIOVASCULAR CONDITION: Primary | ICD-10-CM

## 2023-12-14 DIAGNOSIS — I10 ESSENTIAL HYPERTENSION: ICD-10-CM

## 2023-12-15 ENCOUNTER — APPOINTMENT (OUTPATIENT)
Dept: LAB | Facility: HOSPITAL | Age: 48
End: 2023-12-15
Attending: FAMILY MEDICINE
Payer: COMMERCIAL

## 2023-12-15 DIAGNOSIS — I10 ESSENTIAL HYPERTENSION: ICD-10-CM

## 2023-12-15 DIAGNOSIS — Z13.6 SCREENING FOR CARDIOVASCULAR CONDITION: ICD-10-CM

## 2023-12-15 LAB
ALBUMIN SERPL BCP-MCNC: 4.5 G/DL (ref 3.5–5)
ALP SERPL-CCNC: 48 U/L (ref 34–104)
ALT SERPL W P-5'-P-CCNC: 65 U/L (ref 7–52)
ANION GAP SERPL CALCULATED.3IONS-SCNC: 5 MMOL/L
AST SERPL W P-5'-P-CCNC: 32 U/L (ref 13–39)
BASOPHILS # BLD AUTO: 0.07 THOUSANDS/ÂΜL (ref 0–0.1)
BASOPHILS NFR BLD AUTO: 1 % (ref 0–1)
BILIRUB SERPL-MCNC: 0.8 MG/DL (ref 0.2–1)
BUN SERPL-MCNC: 14 MG/DL (ref 5–25)
CALCIUM SERPL-MCNC: 9.5 MG/DL (ref 8.4–10.2)
CHLORIDE SERPL-SCNC: 102 MMOL/L (ref 96–108)
CHOLEST SERPL-MCNC: 192 MG/DL
CO2 SERPL-SCNC: 32 MMOL/L (ref 21–32)
CREAT SERPL-MCNC: 0.94 MG/DL (ref 0.6–1.3)
EOSINOPHIL # BLD AUTO: 0.5 THOUSAND/ÂΜL (ref 0–0.61)
EOSINOPHIL NFR BLD AUTO: 6 % (ref 0–6)
ERYTHROCYTE [DISTWIDTH] IN BLOOD BY AUTOMATED COUNT: 12.3 % (ref 11.6–15.1)
GFR SERPL CREATININE-BSD FRML MDRD: 96 ML/MIN/1.73SQ M
GLUCOSE P FAST SERPL-MCNC: 94 MG/DL (ref 65–99)
HCT VFR BLD AUTO: 52.4 % (ref 36.5–49.3)
HDLC SERPL-MCNC: 30 MG/DL
HGB BLD-MCNC: 17.8 G/DL (ref 12–17)
IMM GRANULOCYTES # BLD AUTO: 0.03 THOUSAND/UL (ref 0–0.2)
IMM GRANULOCYTES NFR BLD AUTO: 0 % (ref 0–2)
LDLC SERPL CALC-MCNC: 122 MG/DL (ref 0–100)
LYMPHOCYTES # BLD AUTO: 1.68 THOUSANDS/ÂΜL (ref 0.6–4.47)
LYMPHOCYTES NFR BLD AUTO: 20 % (ref 14–44)
MCH RBC QN AUTO: 33 PG (ref 26.8–34.3)
MCHC RBC AUTO-ENTMCNC: 34 G/DL (ref 31.4–37.4)
MCV RBC AUTO: 97 FL (ref 82–98)
MONOCYTES # BLD AUTO: 0.44 THOUSAND/ÂΜL (ref 0.17–1.22)
MONOCYTES NFR BLD AUTO: 5 % (ref 4–12)
NEUTROPHILS # BLD AUTO: 5.8 THOUSANDS/ÂΜL (ref 1.85–7.62)
NEUTS SEG NFR BLD AUTO: 68 % (ref 43–75)
NONHDLC SERPL-MCNC: 162 MG/DL
NRBC BLD AUTO-RTO: 0 /100 WBCS
PLATELET # BLD AUTO: 232 THOUSANDS/UL (ref 149–390)
PMV BLD AUTO: 10.8 FL (ref 8.9–12.7)
POTASSIUM SERPL-SCNC: 4.5 MMOL/L (ref 3.5–5.3)
PROT SERPL-MCNC: 7 G/DL (ref 6.4–8.4)
RBC # BLD AUTO: 5.4 MILLION/UL (ref 3.88–5.62)
SODIUM SERPL-SCNC: 139 MMOL/L (ref 135–147)
TRIGL SERPL-MCNC: 201 MG/DL
WBC # BLD AUTO: 8.52 THOUSAND/UL (ref 4.31–10.16)

## 2023-12-15 PROCEDURE — 80053 COMPREHEN METABOLIC PANEL: CPT

## 2023-12-15 PROCEDURE — 36415 COLL VENOUS BLD VENIPUNCTURE: CPT

## 2023-12-15 PROCEDURE — 80061 LIPID PANEL: CPT

## 2023-12-15 PROCEDURE — 85025 COMPLETE CBC W/AUTO DIFF WBC: CPT

## 2023-12-24 ENCOUNTER — APPOINTMENT (EMERGENCY)
Dept: RADIOLOGY | Facility: HOSPITAL | Age: 48
End: 2023-12-24
Payer: COMMERCIAL

## 2023-12-24 ENCOUNTER — HOSPITAL ENCOUNTER (OUTPATIENT)
Facility: HOSPITAL | Age: 48
Setting detail: OBSERVATION
Discharge: HOME | End: 2023-12-25
Attending: EMERGENCY MEDICINE | Admitting: INTERNAL MEDICINE
Payer: COMMERCIAL

## 2023-12-24 DIAGNOSIS — J96.01 ACUTE HYPOXEMIC RESPIRATORY FAILURE (HCC): ICD-10-CM

## 2023-12-24 DIAGNOSIS — J10.1 INFLUENZA A: Primary | ICD-10-CM

## 2023-12-24 DIAGNOSIS — E87.6 HYPOKALEMIA: ICD-10-CM

## 2023-12-24 PROBLEM — A41.89 VIRAL SEPSIS: Status: ACTIVE | Noted: 2023-12-24

## 2023-12-24 PROBLEM — J11.1 INFLUENZA: Status: ACTIVE | Noted: 2023-12-24

## 2023-12-24 PROBLEM — B97.89 VIRAL SEPSIS: Status: ACTIVE | Noted: 2023-12-24

## 2023-12-24 LAB
ALBUMIN SERPL BCP-MCNC: 4.3 G/DL (ref 3.5–5)
ALP SERPL-CCNC: 47 U/L (ref 34–104)
ALT SERPL W P-5'-P-CCNC: 40 U/L (ref 7–52)
ANION GAP SERPL CALCULATED.3IONS-SCNC: 8 MMOL/L
APTT PPP: 31 SECONDS (ref 23–37)
AST SERPL W P-5'-P-CCNC: 24 U/L (ref 13–39)
BASOPHILS # BLD AUTO: 0.02 THOUSANDS/ÂΜL (ref 0–0.1)
BASOPHILS NFR BLD AUTO: 0 % (ref 0–1)
BILIRUB SERPL-MCNC: 0.55 MG/DL (ref 0.2–1)
BNP SERPL-MCNC: 33 PG/ML (ref 0–100)
BUN SERPL-MCNC: 14 MG/DL (ref 5–25)
CALCIUM SERPL-MCNC: 8.9 MG/DL (ref 8.4–10.2)
CARDIAC TROPONIN I PNL SERPL HS: 11 NG/L
CHLORIDE SERPL-SCNC: 100 MMOL/L (ref 96–108)
CO2 SERPL-SCNC: 27 MMOL/L (ref 21–32)
CREAT SERPL-MCNC: 0.83 MG/DL (ref 0.6–1.3)
D DIMER PPP FEU-MCNC: 0.31 UG/ML FEU
EOSINOPHIL # BLD AUTO: 0.02 THOUSAND/ÂΜL (ref 0–0.61)
EOSINOPHIL NFR BLD AUTO: 0 % (ref 0–6)
ERYTHROCYTE [DISTWIDTH] IN BLOOD BY AUTOMATED COUNT: 12.4 % (ref 11.6–15.1)
FLUAV RNA RESP QL NAA+PROBE: POSITIVE
FLUBV RNA RESP QL NAA+PROBE: NEGATIVE
GFR SERPL CREATININE-BSD FRML MDRD: 105 ML/MIN/1.73SQ M
GLUCOSE SERPL-MCNC: 130 MG/DL (ref 65–140)
HCT VFR BLD AUTO: 45.3 % (ref 36.5–49.3)
HGB BLD-MCNC: 15.4 G/DL (ref 12–17)
IMM GRANULOCYTES # BLD AUTO: 0.02 THOUSAND/UL (ref 0–0.2)
IMM GRANULOCYTES NFR BLD AUTO: 0 % (ref 0–2)
INR PPP: 1.12 (ref 0.84–1.19)
LACTATE SERPL-SCNC: 1.3 MMOL/L (ref 0.5–2)
LYMPHOCYTES # BLD AUTO: 0.59 THOUSANDS/ÂΜL (ref 0.6–4.47)
LYMPHOCYTES NFR BLD AUTO: 8 % (ref 14–44)
MCH RBC QN AUTO: 32.6 PG (ref 26.8–34.3)
MCHC RBC AUTO-ENTMCNC: 34 G/DL (ref 31.4–37.4)
MCV RBC AUTO: 96 FL (ref 82–98)
MONOCYTES # BLD AUTO: 0.68 THOUSAND/ÂΜL (ref 0.17–1.22)
MONOCYTES NFR BLD AUTO: 9 % (ref 4–12)
NEUTROPHILS # BLD AUTO: 6.51 THOUSANDS/ÂΜL (ref 1.85–7.62)
NEUTS SEG NFR BLD AUTO: 83 % (ref 43–75)
NRBC BLD AUTO-RTO: 0 /100 WBCS
PLATELET # BLD AUTO: 133 THOUSANDS/UL (ref 149–390)
PMV BLD AUTO: 10.4 FL (ref 8.9–12.7)
POTASSIUM SERPL-SCNC: 3.3 MMOL/L (ref 3.5–5.3)
PROCALCITONIN SERPL-MCNC: 0.07 NG/ML
PROT SERPL-MCNC: 7 G/DL (ref 6.4–8.4)
PROTHROMBIN TIME: 14.3 SECONDS (ref 11.6–14.5)
RBC # BLD AUTO: 4.73 MILLION/UL (ref 3.88–5.62)
RSV RNA RESP QL NAA+PROBE: NEGATIVE
SARS-COV-2 RNA RESP QL NAA+PROBE: NEGATIVE
SODIUM SERPL-SCNC: 135 MMOL/L (ref 135–147)
WBC # BLD AUTO: 7.84 THOUSAND/UL (ref 4.31–10.16)

## 2023-12-24 PROCEDURE — 87040 BLOOD CULTURE FOR BACTERIA: CPT | Performed by: PHYSICIAN ASSISTANT

## 2023-12-24 PROCEDURE — 0241U HB NFCT DS VIR RESP RNA 4 TRGT: CPT | Performed by: PHYSICIAN ASSISTANT

## 2023-12-24 PROCEDURE — 84145 PROCALCITONIN (PCT): CPT | Performed by: PHYSICIAN ASSISTANT

## 2023-12-24 PROCEDURE — 83605 ASSAY OF LACTIC ACID: CPT | Performed by: PHYSICIAN ASSISTANT

## 2023-12-24 PROCEDURE — 85610 PROTHROMBIN TIME: CPT | Performed by: PHYSICIAN ASSISTANT

## 2023-12-24 PROCEDURE — 85025 COMPLETE CBC W/AUTO DIFF WBC: CPT | Performed by: PHYSICIAN ASSISTANT

## 2023-12-24 PROCEDURE — 71046 X-RAY EXAM CHEST 2 VIEWS: CPT

## 2023-12-24 PROCEDURE — 85379 FIBRIN DEGRADATION QUANT: CPT | Performed by: PHYSICIAN ASSISTANT

## 2023-12-24 PROCEDURE — 80053 COMPREHEN METABOLIC PANEL: CPT | Performed by: PHYSICIAN ASSISTANT

## 2023-12-24 PROCEDURE — 99285 EMERGENCY DEPT VISIT HI MDM: CPT | Performed by: PHYSICIAN ASSISTANT

## 2023-12-24 PROCEDURE — 94640 AIRWAY INHALATION TREATMENT: CPT

## 2023-12-24 PROCEDURE — 94760 N-INVAS EAR/PLS OXIMETRY 1: CPT

## 2023-12-24 PROCEDURE — 85730 THROMBOPLASTIN TIME PARTIAL: CPT | Performed by: PHYSICIAN ASSISTANT

## 2023-12-24 PROCEDURE — 36415 COLL VENOUS BLD VENIPUNCTURE: CPT | Performed by: PHYSICIAN ASSISTANT

## 2023-12-24 PROCEDURE — 84484 ASSAY OF TROPONIN QUANT: CPT | Performed by: PHYSICIAN ASSISTANT

## 2023-12-24 PROCEDURE — 99222 1ST HOSP IP/OBS MODERATE 55: CPT | Performed by: INTERNAL MEDICINE

## 2023-12-24 PROCEDURE — 93005 ELECTROCARDIOGRAM TRACING: CPT

## 2023-12-24 PROCEDURE — 96374 THER/PROPH/DIAG INJ IV PUSH: CPT

## 2023-12-24 PROCEDURE — 94664 DEMO&/EVAL PT USE INHALER: CPT

## 2023-12-24 PROCEDURE — 83880 ASSAY OF NATRIURETIC PEPTIDE: CPT | Performed by: PHYSICIAN ASSISTANT

## 2023-12-24 PROCEDURE — 99284 EMERGENCY DEPT VISIT MOD MDM: CPT

## 2023-12-24 RX ORDER — SODIUM CHLORIDE 9 MG/ML
75 INJECTION, SOLUTION INTRAVENOUS CONTINUOUS
Status: DISCONTINUED | OUTPATIENT
Start: 2023-12-24 | End: 2023-12-25 | Stop reason: HOSPADM

## 2023-12-24 RX ORDER — METHYLPREDNISOLONE SODIUM SUCCINATE 40 MG/ML
40 INJECTION, POWDER, LYOPHILIZED, FOR SOLUTION INTRAMUSCULAR; INTRAVENOUS EVERY 8 HOURS SCHEDULED
Status: DISCONTINUED | OUTPATIENT
Start: 2023-12-25 | End: 2023-12-25 | Stop reason: HOSPADM

## 2023-12-24 RX ORDER — ENOXAPARIN SODIUM 100 MG/ML
40 INJECTION SUBCUTANEOUS EVERY 12 HOURS
Status: DISCONTINUED | OUTPATIENT
Start: 2023-12-24 | End: 2023-12-24

## 2023-12-24 RX ORDER — POTASSIUM CHLORIDE 20 MEQ/1
40 TABLET, EXTENDED RELEASE ORAL ONCE
Status: COMPLETED | OUTPATIENT
Start: 2023-12-24 | End: 2023-12-24

## 2023-12-24 RX ORDER — BENZONATATE 100 MG/1
100 CAPSULE ORAL 3 TIMES DAILY PRN
Status: DISCONTINUED | OUTPATIENT
Start: 2023-12-24 | End: 2023-12-25 | Stop reason: HOSPADM

## 2023-12-24 RX ORDER — SODIUM CHLORIDE FOR INHALATION 0.9 %
3 VIAL, NEBULIZER (ML) INHALATION
Status: DISCONTINUED | OUTPATIENT
Start: 2023-12-24 | End: 2023-12-24

## 2023-12-24 RX ORDER — BUPROPION HYDROCHLORIDE 150 MG/1
150 TABLET ORAL DAILY
Status: DISCONTINUED | OUTPATIENT
Start: 2023-12-25 | End: 2023-12-24

## 2023-12-24 RX ORDER — OSELTAMIVIR PHOSPHATE 75 MG/1
75 CAPSULE ORAL EVERY 12 HOURS SCHEDULED
Status: DISCONTINUED | OUTPATIENT
Start: 2023-12-25 | End: 2023-12-25 | Stop reason: HOSPADM

## 2023-12-24 RX ORDER — LEVALBUTEROL INHALATION SOLUTION 1.25 MG/3ML
1.25 SOLUTION RESPIRATORY (INHALATION)
Status: DISCONTINUED | OUTPATIENT
Start: 2023-12-24 | End: 2023-12-25 | Stop reason: HOSPADM

## 2023-12-24 RX ORDER — OSELTAMIVIR PHOSPHATE 75 MG/1
75 CAPSULE ORAL ONCE
Status: COMPLETED | OUTPATIENT
Start: 2023-12-24 | End: 2023-12-24

## 2023-12-24 RX ORDER — IPRATROPIUM BROMIDE AND ALBUTEROL SULFATE 2.5; .5 MG/3ML; MG/3ML
3 SOLUTION RESPIRATORY (INHALATION)
Status: DISCONTINUED | OUTPATIENT
Start: 2023-12-24 | End: 2023-12-24

## 2023-12-24 RX ORDER — LOSARTAN POTASSIUM 50 MG/1
50 TABLET ORAL DAILY
Status: DISCONTINUED | OUTPATIENT
Start: 2023-12-25 | End: 2023-12-25 | Stop reason: HOSPADM

## 2023-12-24 RX ORDER — METHYLPREDNISOLONE SODIUM SUCCINATE 125 MG/2ML
125 INJECTION, POWDER, LYOPHILIZED, FOR SOLUTION INTRAMUSCULAR; INTRAVENOUS ONCE
Status: COMPLETED | OUTPATIENT
Start: 2023-12-24 | End: 2023-12-24

## 2023-12-24 RX ORDER — ACETAMINOPHEN 325 MG/1
650 TABLET ORAL EVERY 6 HOURS PRN
Status: DISCONTINUED | OUTPATIENT
Start: 2023-12-24 | End: 2023-12-25 | Stop reason: HOSPADM

## 2023-12-24 RX ADMIN — BENZONATATE 100 MG: 100 CAPSULE ORAL at 20:44

## 2023-12-24 RX ADMIN — POTASSIUM CHLORIDE 40 MEQ: 1500 TABLET, EXTENDED RELEASE ORAL at 19:18

## 2023-12-24 RX ADMIN — OSELTAMIVIR PHOSPHATE 75 MG: 75 CAPSULE ORAL at 19:58

## 2023-12-24 RX ADMIN — LEVALBUTEROL HYDROCHLORIDE 1.25 MG: 1.25 SOLUTION RESPIRATORY (INHALATION) at 21:02

## 2023-12-24 RX ADMIN — SODIUM CHLORIDE 75 ML/HR: 0.9 INJECTION, SOLUTION INTRAVENOUS at 20:45

## 2023-12-24 RX ADMIN — METHYLPREDNISOLONE SODIUM SUCCINATE 125 MG: 125 INJECTION, POWDER, FOR SOLUTION INTRAMUSCULAR; INTRAVENOUS at 18:24

## 2023-12-24 RX ADMIN — IPRATROPIUM BROMIDE AND ALBUTEROL SULFATE 3 ML: .5; 3 SOLUTION RESPIRATORY (INHALATION) at 18:24

## 2023-12-24 NOTE — LETTER
[facility] 3RD  FLOOR MED SURG UNIT  [address] [address]  Dept: [phone]    December 25, 2023     Patient: Keny Padilla   YOB: 1975   Date of Visit: 12/24/2023       To Whom it May Concern:    Keny Padilla is under my professional care. He was seen in the hospital from 12/24/2023 to 12/25/23. He may return to work on 1-4-24 without limitations.    If you have any questions or concerns, please don't hesitate to call.         Sincerely,          Kandy Mosquera MD

## 2023-12-25 VITALS
HEIGHT: 72 IN | SYSTOLIC BLOOD PRESSURE: 147 MMHG | OXYGEN SATURATION: 96 % | WEIGHT: 301 LBS | DIASTOLIC BLOOD PRESSURE: 78 MMHG | RESPIRATION RATE: 20 BRPM | BODY MASS INDEX: 40.77 KG/M2 | TEMPERATURE: 98.3 F | HEART RATE: 73 BPM

## 2023-12-25 LAB
ANION GAP SERPL CALCULATED.3IONS-SCNC: 9 MMOL/L
BASOPHILS # BLD AUTO: 0.01 THOUSANDS/ÂΜL (ref 0–0.1)
BASOPHILS NFR BLD AUTO: 0 % (ref 0–1)
BUN SERPL-MCNC: 12 MG/DL (ref 5–25)
CALCIUM SERPL-MCNC: 8.8 MG/DL (ref 8.4–10.2)
CHLORIDE SERPL-SCNC: 103 MMOL/L (ref 96–108)
CO2 SERPL-SCNC: 26 MMOL/L (ref 21–32)
CREAT SERPL-MCNC: 0.7 MG/DL (ref 0.6–1.3)
DME PARACHUTE DELIVERY DATE REQUESTED: NORMAL
DME PARACHUTE ITEM DESCRIPTION: NORMAL
DME PARACHUTE ORDER STATUS: NORMAL
DME PARACHUTE SUPPLIER NAME: NORMAL
DME PARACHUTE SUPPLIER PHONE: NORMAL
EOSINOPHIL # BLD AUTO: 0 THOUSAND/ÂΜL (ref 0–0.61)
EOSINOPHIL NFR BLD AUTO: 0 % (ref 0–6)
ERYTHROCYTE [DISTWIDTH] IN BLOOD BY AUTOMATED COUNT: 12.2 % (ref 11.6–15.1)
GFR SERPL CREATININE-BSD FRML MDRD: 113 ML/MIN/1.73SQ M
GLUCOSE P FAST SERPL-MCNC: 123 MG/DL (ref 65–99)
GLUCOSE SERPL-MCNC: 123 MG/DL (ref 65–140)
HCT VFR BLD AUTO: 47.1 % (ref 36.5–49.3)
HGB BLD-MCNC: 16 G/DL (ref 12–17)
IMM GRANULOCYTES # BLD AUTO: 0.03 THOUSAND/UL (ref 0–0.2)
IMM GRANULOCYTES NFR BLD AUTO: 0 % (ref 0–2)
LYMPHOCYTES # BLD AUTO: 0.45 THOUSANDS/ÂΜL (ref 0.6–4.47)
LYMPHOCYTES NFR BLD AUTO: 7 % (ref 14–44)
MCH RBC QN AUTO: 32.9 PG (ref 26.8–34.3)
MCHC RBC AUTO-ENTMCNC: 34 G/DL (ref 31.4–37.4)
MCV RBC AUTO: 97 FL (ref 82–98)
MONOCYTES # BLD AUTO: 0.23 THOUSAND/ÂΜL (ref 0.17–1.22)
MONOCYTES NFR BLD AUTO: 3 % (ref 4–12)
NEUTROPHILS # BLD AUTO: 6.08 THOUSANDS/ÂΜL (ref 1.85–7.62)
NEUTS SEG NFR BLD AUTO: 90 % (ref 43–75)
NRBC BLD AUTO-RTO: 0 /100 WBCS
PLATELET # BLD AUTO: 114 THOUSANDS/UL (ref 149–390)
PMV BLD AUTO: 12.2 FL (ref 8.9–12.7)
POTASSIUM SERPL-SCNC: 4.5 MMOL/L (ref 3.5–5.3)
PROCALCITONIN SERPL-MCNC: <0.05 NG/ML
RBC # BLD AUTO: 4.86 MILLION/UL (ref 3.88–5.62)
SODIUM SERPL-SCNC: 138 MMOL/L (ref 135–147)
WBC # BLD AUTO: 6.8 THOUSAND/UL (ref 4.31–10.16)

## 2023-12-25 PROCEDURE — 94761 N-INVAS EAR/PLS OXIMETRY MLT: CPT

## 2023-12-25 PROCEDURE — 99239 HOSP IP/OBS DSCHRG MGMT >30: CPT | Performed by: INTERNAL MEDICINE

## 2023-12-25 PROCEDURE — 94640 AIRWAY INHALATION TREATMENT: CPT

## 2023-12-25 PROCEDURE — 85025 COMPLETE CBC W/AUTO DIFF WBC: CPT | Performed by: INTERNAL MEDICINE

## 2023-12-25 PROCEDURE — 94760 N-INVAS EAR/PLS OXIMETRY 1: CPT

## 2023-12-25 PROCEDURE — 80048 BASIC METABOLIC PNL TOTAL CA: CPT | Performed by: INTERNAL MEDICINE

## 2023-12-25 PROCEDURE — 84145 PROCALCITONIN (PCT): CPT | Performed by: INTERNAL MEDICINE

## 2023-12-25 RX ORDER — OSELTAMIVIR PHOSPHATE 75 MG/1
75 CAPSULE ORAL EVERY 12 HOURS SCHEDULED
Qty: 8 CAPSULE | Refills: 0 | Status: SHIPPED | OUTPATIENT
Start: 2023-12-25 | End: 2023-12-29

## 2023-12-25 RX ORDER — PREDNISONE 20 MG/1
40 TABLET ORAL DAILY
Qty: 10 TABLET | Refills: 0 | Status: SHIPPED | OUTPATIENT
Start: 2023-12-25 | End: 2023-12-30

## 2023-12-25 RX ORDER — BENZONATATE 100 MG/1
100 CAPSULE ORAL 3 TIMES DAILY PRN
Qty: 20 CAPSULE | Refills: 0 | Status: SHIPPED | OUTPATIENT
Start: 2023-12-25 | End: 2024-01-03

## 2023-12-25 RX ADMIN — OSELTAMIVIR PHOSPHATE 75 MG: 75 CAPSULE ORAL at 08:27

## 2023-12-25 RX ADMIN — LEVALBUTEROL HYDROCHLORIDE 1.25 MG: 1.25 SOLUTION RESPIRATORY (INHALATION) at 07:58

## 2023-12-25 RX ADMIN — METHYLPREDNISOLONE SODIUM SUCCINATE 40 MG: 40 INJECTION, POWDER, FOR SOLUTION INTRAMUSCULAR; INTRAVENOUS at 05:58

## 2023-12-25 RX ADMIN — METHYLPREDNISOLONE SODIUM SUCCINATE 40 MG: 40 INJECTION, POWDER, FOR SOLUTION INTRAMUSCULAR; INTRAVENOUS at 15:02

## 2023-12-25 RX ADMIN — LOSARTAN POTASSIUM 50 MG: 50 TABLET, FILM COATED ORAL at 08:27

## 2023-12-25 RX ADMIN — LEVALBUTEROL HYDROCHLORIDE 1.25 MG: 1.25 SOLUTION RESPIRATORY (INHALATION) at 14:57

## 2023-12-25 NOTE — DISCHARGE SUMMARY
[facility]  Discharge- Keny Padilla 1975, 48 y.o. male MRN: [id number]  Unit/Bed#: -[facility] Encounter: [id number]  Primary Care Provider: Santi Echevarria MD   Date and time admitted to hospital: 12/24/2023  5:54 PM    Viral sepsis   Assessment & Plan  Noted to have a temperature of 100.5, tachycardia with influenza A infection.    Plan as above.    Influenza  Assessment & Plan  Patient with no recent influenza vaccination.  Smoker currently.  History of asthma.  Presented with acute hypoxic respiratory failure.    See plan as above.    Morbid obesity due to excess calories (HCC)  Assessment & Plan  Highly encouraged weight loss with diet and exercise modification.    Moderate persistent allergic asthma  Assessment & Plan  On albuterol at home will change to Xopenex nebulizers and albuterol inhaler as needed while inpatient.    Tobacco use  Assessment & Plan  Encourage smoking cessation    Essential hypertension  Assessment & Plan  Irbesartan changed to losartan while inpatient.     * Acute respiratory failure with hypoxia (HCC)  Assessment & Plan  Patient presented with chest tightness, shortness of breath, worsening cough.  No URTI symptoms.  Found to have influenza A infection.  Was hypoxic to 88 to 89% requiring 2 L.  No baseline oxygen requirement at home.  Etiology suspected influenza versus asthma exacerbation or both.  Does have history of moderate persistent asthma on albuterol.  Low suspicion for infection.  Chest x-ray was otherwise unremarkable.  No leukocytosis.  No signs of volume overload.    Solu-Medrol 40 mg every 8 hours  Titrate oxygen for more than 88%  Tamiflu for 5 days  IV fluid 75 cc/h overnight  Encourage oral hydration  Tylenol as needed  Tessalon Perles as needed  Xopenex nebs 3 times daily  Droplet isolation  Trend fever/leukocyte count  Hold off on any antibiotic given low suspicion for infection  Trend procalcitonin  Follow-up pending blood  cultures              Medical Problems       Resolved Problems  Date Reviewed: 12/25/2023   None         Admission Date:   Admission Orders (From admission, onward)       Ordered        12/24/23 1933  Place in Observation  Once                            Admitting Diagnosis: Cough [R05.9]  Hypokalemia [E87.6]  Influenza A [J10.1]  Acute hypoxemic respiratory failure (HCC) [J96.01]        Procedures Performed:   Orders Placed This Encounter   Procedures    ED ECG Documentation Only       Summary of Hospital Course:   48year-old male with history of 40 bronchial asthma, obesity, hypertension, active tobacco use disorder presents with chest congestion cough and shortness of breath on exertion.  Denies of any fever or chest pain.  Tested positive for influenza A.  In the ED was noted to be hypoxic to 88% to 89% requiring 2 L of oxygen by nasal cannula.  Received nebulizer treatment and IV steroids and patient has a history of moderate persistent asthma on albuterol as needed.  Patient was given IV steroids and Tamiflu and clinically improved.  Will titrate oxygen to room air and monitor O2 saturation.  Patient clinically feels better and stable for discharge on short course of steroid prednisone and Tamiflu for 4 days.    Patient was noted to need 1.5 L of oxygen by nasal cannula during exertion during home O2 evaluation and oxygen was arranged,  HEENT-PERRLA, moist oral mucosa  Neck-supple, no JVD elevation   Respiratory-decreased air entry at the bases   cardiovascular system-S1, S2 heard, no murmur or gallops or rubs  Abdomen-soft, nontender, no guarding or rigidity, bowel sounds heard  Extremities-no pedal edema  Peripheral pulses palpable  Musculoskeletal-no contractures  Central nervous system-no acute focal neurological deficit ,no sensory or motor deficit noted.  Skin-no rash noted       Significant Findings, Care, Treatment and Services Provided: IV fluid hydration and oseltamivir and nebulizer  treatment    Complications: nil    Condition at Discharge: good         Discharge instructions/Information to patient and family:   See after visit summary for information provided to patient and family.      Provisions for Follow-Up Care:  See after visit summary for information related to follow-up care and any pertinent home health orders.      PCP: Santi Echevarria MD    Disposition: Home    Planned Readmission: No    Discharge Statement   I spent 45 minutes discharging the patient. This time was spent on the day of discharge. I had direct contact with the patient on the day of discharge. Additional documentation is required if more than 30 minutes were spent on discharge.     Discharge Medications:  See after visit summary for reconciled discharge medications provided to patient and family.

## 2023-12-25 NOTE — UTILIZATION REVIEW
"  Initial Clinical Review    Admission: Date/Time/Statement:   Admission Orders (From admission, onward)       Ordered        12/24/23 1933  Place in Observation  Once                     Orders Placed This Encounter   Procedures    Place in Observation     Standing Status:   Standing     Number of Occurrences:   1     Order Specific Question:   Level of Care     Answer:   Med Surg [16]     ED Arrival Information       Expected   -    Arrival   12/24/2023 17:52    Acuity   Urgent              Means of arrival   Walk-In    Escorted by   Self    Service   Hospitalist    Admission type   Emergency              Arrival complaint   Cough          Chief Complaint   Patient presents with    Cough     Cough \"I'm having a hard time catching my breath, I feel like there's fluid in my lungs\" x 2 days      Initial Presentation:   48 year old male with PMHx HTN, asthma, current smoker - presents to [facility] ED on 12/24/23 2nd 1 day history of worsening cough, shortness of breath - worse with activity such as ambulating + chest tightness. In ED - Temp 100.5    Rm Air SpO2 91 %.  Exam: Diminished breath sounds bilaterally.  Chest x ray unremarkable.  Labs: K+ 3.3, +Influenza A  ED Tx: NC O2 at 2 L, Neb Tx, IV Solumedrol, po tamiflu + KCL.  Placed in Observation 12/24/23 at 1933 -           Acute respiratory failure with hypoxia (HCC)  Patient presented with chest tightness, shortness of breath, worsening cough.  No URTI symptoms.  Found to have influenza A infection.  Was hypoxic to 88 to 89% requiring 2 L.  No baseline oxygen requirement at home.  Etiology suspected influenza versus asthma exacerbation or both.  Does have history of moderate persistent asthma on albuterol.  Low suspicion for infection.  Chest x-ray was otherwise unremarkable.  No leukocytosis.  No signs of volume overload.   Solu-Medrol 40 mg every 8 hours  Titrate oxygen for more than 88%  Tamiflu for 5 days  IV fluid 75 cc/h overnight  Encourage oral " hydration  Tylenol as needed  Tessalon Perles as needed  Xopenex nebs 3 times daily  Droplet isolation  Trend fever/leukocyte count  Hold off on any antibiotic given low suspicion for infection  Trend procalcitonin  Follow-up pending blood cultures     Viral sepsis   Noted to have a temperature of 100.5, tachycardia with influenza A infection.   Plan as above.          Influenza A  Patient with no recent influenza vaccination.  Smoker currently.  History of asthma.  Presented with acute hypoxic respiratory failure.     12/24/23 AT 1926:  Room Air SpO2 88 % - NC O2 at 2 L    12/25/23 - DAY 2:      ED Triage Vitals   Temperature Pulse Respirations Blood Pressure SpO2   12/24/23 1802 12/24/23 1802 12/24/23 1802 12/24/23 1802 12/24/23 1802   100.5 °F (38.1 °C) (!) 108 20 160/87 RM AIR  91 %      Temp Source Heart Rate Source Patient Position - Orthostatic VS BP Location FiO2 (%)   12/24/23 1802 12/24/23 1802 12/24/23 1802 12/24/23 1802 --   Oral Monitor Sitting Left arm       Wt Readings from Last 1 Encounters:   12/25/23 (!) 137 kg (301 lb)     Additional Vital Signs:           Pertinent Labs/Diagnostic Test Results:   XR chest 2 views    (Results Pending)     Results from last 7 days   Lab Units 12/24/23  1817   SARS-COV-2  Negative     Results from last 7 days   Lab Units 12/25/23  0458 12/24/23  1817   WBC Thousand/uL 6.80 7.84   HEMOGLOBIN g/dL 16.0 15.4   HEMATOCRIT % 47.1 45.3   PLATELETS Thousands/uL 114* 133*   NEUTROS ABS Thousands/µL 6.08 6.51     Results from last 7 days   Lab Units 12/25/23  0458 12/24/23  1817   SODIUM mmol/L 138 135   POTASSIUM mmol/L 4.5 3.3*   CHLORIDE mmol/L 103 100   CO2 mmol/L 26 27   ANION GAP mmol/L 9 8   BUN mg/dL 12 14   CREATININE mg/dL 0.70 0.83   EGFR ml/min/1.73sq m 113 105   CALCIUM mg/dL 8.8 8.9     Results from last 7 days   Lab Units 12/24/23  1817   AST U/L 24   ALT U/L 40   ALK PHOS U/L 47   TOTAL PROTEIN g/dL 7.0   ALBUMIN g/dL 4.3   TOTAL BILIRUBIN mg/dL 0.55        Results from last 7 days   Lab Units 12/25/23  0458 12/24/23  1817   GLUCOSE RANDOM mg/dL 123 130     Results from last 7 days   Lab Units 12/24/23  1817   HS TNI 0HR ng/L 11     Results from last 7 days   Lab Units 12/24/23  1817   D-DIMER QUANTITATIVE ug/ml FEU 0.31     Results from last 7 days   Lab Units 12/24/23  1817   PROTIME seconds 14.3   INR  1.12   PTT seconds 31     Results from last 7 days   Lab Units 12/25/23  0458 12/24/23  1817   PROCALCITONIN ng/ml <0.05 0.07     Results from last 7 days   Lab Units 12/24/23  1817   LACTIC ACID mmol/L 1.3       Results from last 7 days   Lab Units 12/24/23  1817   BNP pg/mL 33     Results from last 7 days   Lab Units 12/24/23  1817   INFLUENZA A PCR  Positive*   INFLUENZA B PCR  Negative   RSV PCR  Negative     Results from last 7 days   Lab Units 12/24/23  1828 12/24/23  1817   BLOOD CULTURE  Received in Microbiology Lab. Culture in Progress. Received in Microbiology Lab. Culture in Progress.     ED Treatment:   Medication Administration from 12/24/2023 1752 to 12/24/2023 2011         Date/Time Order Dose Route Action     12/24/2023 1824 EST methylPREDNISolone sodium succinate (Solu-MEDROL) injection 125 mg 125 mg Intravenous Given     12/24/2023 1824 EST ipratropium-albuterol (DUO-NEB) 0.5-2.5 mg/3 mL inhalation solution 3 mL 3 mL Nebulization Given     12/24/2023 1918 EST potassium chloride (K-DUR,KLOR-CON) CR tablet 40 mEq 40 mEq Oral Given     12/24/2023 1958 EST oseltamivir (TAMIFLU) capsule 75 mg 75 mg Oral Given       Past Medical History:   Diagnosis Date    Asthma     Hypertension      Present on Admission:   Influenza   Acute respiratory failure with hypoxia (HCC)   Essential hypertension   Moderate persistent allergic asthma   Morbid obesity due to excess calories (HCC)   Tobacco use   Viral sepsis     Admitting Diagnosis: Cough [R05.9]  Hypokalemia [E87.6]  Influenza A [J10.1]  Acute hypoxemic respiratory failure (HCC) [J96.01]    Age/Sex: 48 y.o.  male    Admission Orders:  Telemetry  VS q4hrs  Incentive Spirometry q1hr  Nasal O2 at 2 L/min prn - Titrate SpO2 > 92 %  Continuous Pulse Oximetry  SCD  Up + OOB as tolerated  Diet Regular; House  I+O q shift  Daily weight    Scheduled Medications:  levalbuterol, 1.25 mg, Nebulization, TID  losartan, 50 mg, Oral, Daily  IV methylPREDNISolone sodium succinate, 40 mg, Intravenous, Q8H BIBI  oseltamivir, 75 mg, Oral, Q12H BIBI    Continuous IV Infusions:  IVF sodium chloride, 75 mL/hr, Intravenous, Continuous    PRN Meds:  acetaminophen, 650 mg, Oral, Q6H PRN  benzonatate, 100 mg, Oral, TID PRN - 12/25 X 1 @ 2044    Network Utilization Review Department  ATTENTION: Please call with any questions or concerns to [phone] and carefully listen to the prompts so that you are directed to the right person. All voicemails are confidential.   For Discharge needs, contact Care Management DC Support Team at [phone] opt. [phone]  Send all requests for admission clinical reviews, approved or denied determinations and any other requests to dedicated fax number below belonging to the campus where the patient is receiving treatment. List of dedicated fax numbers for the Facilities:  FACILITY NAME UR FAX NUMBER   ADMISSION DENIALS (Administrative/Medical Necessity) [phone]   DISCHARGE SUPPORT TEAM (NETWORK) [phone]   PARENT CHILD HEALTH (Maternity/NICU/Pediatrics) [phone]   [facility] [phone]   [facility] [phone]   [facility] [phone]   [facility] [phone]   [facility] [phone]   [facility] [phone]   [facility] [phone]   [facility] [phone]   [facility] [phone]   [facility] [phone]   [facility]  General acute hospital [phone]

## 2023-12-25 NOTE — H&P
ADMISSION NOTE   [facility]       Name: Keny Padilla   Age & Sex: 48 y.o. male   MRN: [id number]  Unit/Bed#: -01   Encounter: [id number]  Primary Care Provider: Santi Echevarria MD      * Acute respiratory failure with hypoxia (HCC)  Assessment & Plan  Patient presented with chest tightness, shortness of breath, worsening cough.  No URTI symptoms.  Found to have influenza A infection.  Was hypoxic to 88 to 89% requiring 2 L.  No baseline oxygen requirement at home.  Etiology suspected influenza versus asthma exacerbation or both.  Does have history of moderate persistent asthma on albuterol.  Low suspicion for infection.  Chest x-ray was otherwise unremarkable.  No leukocytosis.  No signs of volume overload.    Solu-Medrol 40 mg every 8 hours  Titrate oxygen for more than 88%  Tamiflu for 5 days  IV fluid 75 cc/h overnight  Encourage oral hydration  Tylenol as needed  Tessalon Perles as needed  Xopenex nebs 3 times daily  Droplet isolation  Trend fever/leukocyte count  Hold off on any antibiotic given low suspicion for infection  Trend procalcitonin  Follow-up pending blood cultures    Viral sepsis   Assessment & Plan  Noted to have a temperature of 100.5, tachycardia with influenza A infection.    Plan as above.    Influenza  Assessment & Plan  Patient with no recent influenza vaccination.  Smoker currently.  History of asthma.  Presented with acute hypoxic respiratory failure.    See plan as above.    Morbid obesity due to excess calories (HCC)  Assessment & Plan  Highly encouraged weight loss with diet and exercise modification.    Moderate persistent allergic asthma  Assessment & Plan  On albuterol at home will change to Xopenex nebulizers and albuterol inhaler as needed while inpatient.    Tobacco use  Assessment & Plan  Encourage smoking cessation    Essential hypertension  Assessment & Plan  Irbesartan changed to losartan while inpatient.         VTE Prophylaxis: Enoxaparin  (Lovenox)  / sequential compression device and foot pump applied   Code Status: Level 1 full code  POLST: Unknown  Discussion with family: None    Anticipated Length of Stay:  Patient will be admitted on an Observation basis with an anticipated length of stay of less than 2 midnights.   Justification for Hospital Stay: Influenza, acute hypoxic respiratory failure    Total Time for Visit, including Counseling / Coordination of Care: 45 minutes.  Greater than 50% of this total time spent on direct patient counseling and coordination of care.    Chief Complaint:   Worsening cough, shortness of breath    History of Present Illness:    Keny Padilla is a 48 y.o. male with past medical history of smoking, asthma, hypertension who presents with worsening cough, shortness of breath on exertion, chest tightness.  No fever.  No URTI symptoms.  No chest pain, diarrhea, nausea vomiting, palpitations.  Continues to smoke currently.  Takes his medication for hypertension regularly as well.  No recent changes in medication.  No prior vaccination for flu as well this season.    Review of Systems:    Review of Systems   Constitutional:  Positive for activity change, appetite change and fatigue. Negative for chills, diaphoresis, fever and unexpected weight change.   HENT:  Negative for rhinorrhea and sore throat.    Eyes:  Negative for visual disturbance.   Respiratory:  Positive for cough, chest tightness and shortness of breath.    Cardiovascular:  Negative for chest pain, palpitations and leg swelling.   Gastrointestinal:  Negative for abdominal distention, abdominal pain, blood in stool, constipation, diarrhea, nausea and vomiting.   Genitourinary:  Negative for difficulty urinating.   Musculoskeletal:  Negative for arthralgias.   Neurological:  Negative for headaches.   Psychiatric/Behavioral:  Negative for behavioral problems and sleep disturbance.        Past Medical and Surgical History:     Past Medical History:    Diagnosis Date    Asthma     Hypertension        Past Surgical History:   Procedure Laterality Date    WISDOM TOOTH EXTRACTION         Meds/Allergies:    Prior to Admission medications    Medication Sig Start Date End Date Taking? Authorizing Provider   albuterol (ACCUNEB) 1.25 MG/3ML nebulizer solution Take 3 mL (1.25 mg total) by nebulization every 6 (six) hours as needed for wheezing 10/17/23  Yes Santi Echevarria MD   albuterol (PROVENTIL HFA,VENTOLIN HFA) 90 mcg/act inhaler Inhale 2 puffs every 4 (four) hours as needed for wheezing or shortness of breath 10/17/23  Yes Santi Echevarria MD   irbesartan (AVAPRO) 150 mg tablet Take 1 tablet (150 mg total) by mouth daily 10/17/23  Yes Santi Echevarria MD   buPROPion (WELLBUTRIN SR) 150 mg 12 hr tablet Take 1 tablet (150 mg total) by mouth 2 (two) times a day 10/10/22   Santi Echevarria MD   methocarbamol (ROBAXIN) 500 mg tablet Take 1 tablet (500 mg total) by mouth 4 (four) times a day  Patient not taking: Reported on 8/10/2022 5/9/22   Jermaine Wall PA-C   naproxen (Naprosyn) 500 mg tablet Take 1 tablet (500 mg total) by mouth 2 (two) times a day with meals  Patient not taking: Reported on 8/10/2022 5/9/22   Jermaine Wall PA-C     I have reviewed home medications with patient personally.    Allergies: No Known Allergies    Social History:     Marital Status: Single   Substance Use History:   Social History     Substance and Sexual Activity   Alcohol Use Not Currently    Comment: heavy      Social History     Tobacco Use   Smoking Status Every Day    Current packs/day: 1.50    Average packs/day: 1.5 packs/day for 25.0 years (37.5 ttl pk-yrs)    Types: Cigarettes    Start date: 1999   Smokeless Tobacco Never     Social History     Substance and Sexual Activity   Drug Use Never       Family History:    Family History   Problem Relation Age of Onset    Lung cancer Father     Diabetes Sister     COPD Sister         Physical Exam:     Vitals:   Blood  Pressure: 131/51 (12/24/23 2015)  Pulse: 92 (12/24/23 2015)  Temperature: 100.3 °F (37.9 °C) (12/24/23 2015)  Temp Source: Oral (12/24/23 2015)  Respirations: 22 (12/24/23 2015)  Height: 6' (182.9 cm) (12/24/23 2015)  Weight - Scale: (!) 137 kg (301 lb 3.2 oz) (12/24/23 2015)  SpO2: 90 % (12/24/23 2015)    Physical Exam  Vitals reviewed.   Constitutional:       General: He is not in acute distress.     Appearance: Normal appearance. He is obese.   HENT:      Head: Normocephalic and atraumatic.   Eyes:      General: No scleral icterus.        Right eye: No discharge.         Left eye: No discharge.   Cardiovascular:      Rate and Rhythm: Normal rate and regular rhythm.      Pulses: Normal pulses.      Heart sounds: Normal heart sounds.   Pulmonary:      Effort: Pulmonary effort is normal. No respiratory distress.      Breath sounds: Wheezing present. No rales.   Chest:      Chest wall: No tenderness.   Abdominal:      General: Abdomen is flat. Bowel sounds are normal. There is no distension.      Palpations: Abdomen is soft.      Tenderness: There is no abdominal tenderness.   Musculoskeletal:         General: No deformity. Normal range of motion.      Cervical back: Normal range of motion and neck supple.      Right lower leg: No edema.      Left lower leg: No edema.   Skin:     General: Skin is warm.      Coloration: Skin is not jaundiced or pale.      Findings: No rash.   Neurological:      General: No focal deficit present.      Mental Status: He is alert and oriented to person, place, and time. Mental status is at baseline.      Cranial Nerves: No cranial nerve deficit.      Motor: No weakness.   Psychiatric:         Mood and Affect: Mood normal.         Behavior: Behavior normal.         Additional Data:     Lab Results: I have personally reviewed pertinent reports.      Results from last 7 days   Lab Units 12/24/23  1817   WBC Thousand/uL 7.84   HEMOGLOBIN g/dL 15.4   HEMATOCRIT % 45.3   PLATELETS Thousands/uL  133*   NEUTROS PCT % 83*   LYMPHS PCT % 8*   MONOS PCT % 9   EOS PCT % 0     Results from last 7 days   Lab Units 12/24/23  1817   SODIUM mmol/L 135   POTASSIUM mmol/L 3.3*   CHLORIDE mmol/L 100   CO2 mmol/L 27   BUN mg/dL 14   CREATININE mg/dL 0.83   ANION GAP mmol/L 8   CALCIUM mg/dL 8.9   ALBUMIN g/dL 4.3   TOTAL BILIRUBIN mg/dL 0.55   ALK PHOS U/L 47   ALT U/L 40   AST U/L 24   GLUCOSE RANDOM mg/dL 130     Results from last 7 days   Lab Units 12/24/23  1817   INR  1.12             Results from last 7 days   Lab Units 12/24/23  1817   LACTIC ACID mmol/L 1.3   PROCALCITONIN ng/ml 0.07       Imaging: I have personally reviewed pertinent reports.      XR chest 2 views    (Results Pending)       EKG, Pathology, and Other Studies Reviewed on Admission:   EKG: Normal sinus rhythm    Allscripts / Epic Records Reviewed: Yes     ** Please Note: This note has been constructed using a voice recognition system. **

## 2023-12-25 NOTE — PLAN OF CARE
Problem: INFECTION - ADULT  Goal: Absence or prevention of progression during hospitalization  Description: INTERVENTIONS:  - Assess and monitor for signs and symptoms of infection  - Monitor lab/diagnostic results  - Monitor all insertion sites, i.e. indwelling lines, tubes, and drains  - Monitor endotracheal if appropriate and nasal secretions for changes in amount and color  - [place] appropriate cooling/warming therapies per order  - Administer medications as ordered  - Instruct and encourage patient and family to use good hand hygiene technique  - Identify and instruct in appropriate isolation precautions for identified infection/condition  Outcome: Not Progressing  Goal: Absence of fever/infection during neutropenic period  Description: INTERVENTIONS:  - Monitor WBC    Outcome: Not Progressing     Problem: DISCHARGE PLANNING  Goal: Discharge to home or other facility with appropriate resources  Description: INTERVENTIONS:  - Identify barriers to discharge w/patient and caregiver  - Arrange for needed discharge resources and transportation as appropriate  - Identify discharge learning needs (meds, wound care, etc.)  - Arrange for interpretive services to assist at discharge as needed  - Refer to Case Management Department for coordinating discharge planning if the patient needs post-hospital services based on physician/advanced practitioner order or complex needs related to functional status, cognitive ability, or social support system  Outcome: Not Progressing     Problem: Knowledge Deficit  Goal: Patient/family/caregiver demonstrates understanding of disease process, treatment plan, medications, and discharge instructions  Description: Complete learning assessment and assess knowledge base.  Interventions:  - Provide teaching at level of understanding  - Provide teaching via preferred learning methods  Outcome: Not Progressing

## 2023-12-25 NOTE — ASSESSMENT & PLAN NOTE
Patient presented with chest tightness, shortness of breath, worsening cough.  No URTI symptoms.  Found to have influenza A infection.  Was hypoxic to 88 to 89% requiring 2 L.  No baseline oxygen requirement at home.  Etiology suspected influenza versus asthma exacerbation or both.  Does have history of moderate persistent asthma on albuterol.  Low suspicion for infection.  Chest x-ray was otherwise unremarkable.  No leukocytosis.  No signs of volume overload.    Solu-Medrol 40 mg every 8 hours  Titrate oxygen for more than 88%  Tamiflu for 5 days  IV fluid 75 cc/h overnight  Encourage oral hydration  Tylenol as needed  Tessalon Perles as needed  Xopenex nebs 3 times daily  Droplet isolation  Trend fever/leukocyte count  Hold off on any antibiotic given low suspicion for infection  Trend procalcitonin  Follow-up pending blood cultures

## 2023-12-25 NOTE — ED PROVIDER NOTES
"History  Chief Complaint   Patient presents with    Cough     Cough \"I'm having a hard time catching my breath, I feel like there's fluid in my lungs\" x 2 days      This is a 48-year-old male with past medical history significant for hypertension and asthma presenting to the emergency department today for cough.  Cough has been ongoing since yesterday.  Patient also notes shortness of breath but denies any chest pain.  Shortness of breath is worse with ambulation.  He has no lower extremity swelling.  The patient denies any fevers or chills.  No nasal congestion or rhinorrhea.  No sore throat.  Cough is nonproductive.  No known sick contacts.  No nausea, vomiting, diarrhea, or constipation.  No abdominal pain.  No history of venous thromboembolism or pulmonary embolism.  Patient denies other complaints at this time.      History provided by:  Patient   used: No    Cough  Cough characteristics:  Non-productive  Severity:  Moderate  Onset quality:  Gradual  Duration:  1 day  Timing:  Intermittent  Progression:  Waxing and waning  Chronicity:  New  Relieved by:  Nothing  Exacerbated by: ambulation.  Ineffective treatments:  None tried  Associated symptoms: shortness of breath    Associated symptoms: no chest pain, no chills, no diaphoresis, no ear fullness, no ear pain, no eye discharge, no fever, no headaches, no myalgias, no rash, no rhinorrhea, no sinus congestion, no sore throat, no weight loss and no wheezing        Prior to Admission Medications   Prescriptions Last Dose Informant Patient Reported? Taking?   albuterol (ACCUNEB) 1.25 MG/3ML nebulizer solution 12/24/2023  No Yes   Sig: Take 3 mL (1.25 mg total) by nebulization every 6 (six) hours as needed for wheezing   albuterol (PROVENTIL HFA,VENTOLIN HFA) 90 mcg/act inhaler 12/24/2023  No Yes   Sig: Inhale 2 puffs every 4 (four) hours as needed for wheezing or shortness of breath   buPROPion (WELLBUTRIN SR) 150 mg 12 hr tablet More than a " month  No No   Sig: Take 1 tablet (150 mg total) by mouth 2 (two) times a day   irbesartan (AVAPRO) 150 mg tablet 12/23/2023  No Yes   Sig: Take 1 tablet (150 mg total) by mouth daily   methocarbamol (ROBAXIN) 500 mg tablet   No No   Sig: Take 1 tablet (500 mg total) by mouth 4 (four) times a day   Patient not taking: Reported on 8/10/2022   naproxen (Naprosyn) 500 mg tablet   No No   Sig: Take 1 tablet (500 mg total) by mouth 2 (two) times a day with meals   Patient not taking: Reported on 8/10/2022      Facility-Administered Medications: None       Past Medical History:   Diagnosis Date    Asthma     Hypertension        Past Surgical History:   Procedure Laterality Date    WISDOM TOOTH EXTRACTION         Family History   Problem Relation Age of Onset    Lung cancer Father     Diabetes Sister     COPD Sister      I have reviewed and agree with the history as documented.    E-Cigarette/Vaping    E-Cigarette Use Never User      E-Cigarette/Vaping Substances     Social History     Tobacco Use    Smoking status: Every Day     Current packs/day: 1.50     Average packs/day: 1.5 packs/day for 25.0 years (37.5 ttl pk-yrs)     Types: Cigarettes     Start date: 1999    Smokeless tobacco: Never   Vaping Use    Vaping status: Never Used   Substance Use Topics    Alcohol use: Not Currently     Comment: heavy     Drug use: Never       Review of Systems   Constitutional:  Negative for appetite change, chills, diaphoresis, fatigue, fever and weight loss.   HENT:  Negative for ear pain, rhinorrhea and sore throat.    Eyes:  Negative for discharge and visual disturbance.   Respiratory:  Positive for cough and shortness of breath. Negative for chest tightness and wheezing.    Cardiovascular:  Negative for chest pain, palpitations and leg swelling.   Gastrointestinal:  Negative for abdominal pain, constipation, diarrhea, nausea and vomiting.   Genitourinary:  Positive for penile pain.   Musculoskeletal:  Negative for myalgias, neck  pain and neck stiffness.   Skin:  Negative for rash and wound.   Neurological:  Negative for dizziness, seizures, syncope, weakness, light-headedness, numbness and headaches.   Psychiatric/Behavioral:  Negative for confusion.    All other systems reviewed and are negative.      Physical Exam  Physical Exam  Vitals and nursing note reviewed.   Constitutional:       General: He is not in acute distress.     Appearance: Normal appearance. He is obese. He is not ill-appearing, toxic-appearing or diaphoretic.   HENT:      Head: Normocephalic and atraumatic.      Nose: Nose normal. No congestion or rhinorrhea.      Mouth/Throat:      Mouth: Mucous membranes are moist.      Pharynx: No oropharyngeal exudate or posterior oropharyngeal erythema.   Eyes:      General: No scleral icterus.        Right eye: No discharge.         Left eye: No discharge.      Conjunctiva/sclera: Conjunctivae normal.   Cardiovascular:      Rate and Rhythm: Regular rhythm. Tachycardia present.      Pulses: Normal pulses.      Heart sounds: Normal heart sounds. No murmur heard.     No friction rub. No gallop.   Pulmonary:      Effort: Pulmonary effort is normal. No respiratory distress.      Breath sounds: No stridor. No wheezing, rhonchi or rales.      Comments: Diminished breath sounds to the bilateral lung fields  Chest:      Chest wall: No tenderness.   Musculoskeletal:         General: Normal range of motion.      Cervical back: Normal range of motion. No rigidity.      Right lower leg: No edema.      Left lower leg: No edema.      Comments: Negative Homans' sign bilaterally   Skin:     General: Skin is warm and dry.      Capillary Refill: Capillary refill takes less than 2 seconds.      Coloration: Skin is not jaundiced or pale.   Neurological:      General: No focal deficit present.      Mental Status: He is alert and oriented to person, place, and time. Mental status is at baseline.   Psychiatric:         Mood and Affect: Mood normal.          Behavior: Behavior normal.         Vital Signs  ED Triage Vitals   Temperature Pulse Respirations Blood Pressure SpO2   12/24/23 1802 12/24/23 1802 12/24/23 1802 12/24/23 1802 12/24/23 1802   100.5 °F (38.1 °C) (!) 108 20 160/87 91 %      Temp Source Heart Rate Source Patient Position - Orthostatic VS BP Location FiO2 (%)   12/24/23 1802 12/24/23 1802 12/24/23 1802 12/24/23 1802 --   Oral Monitor Sitting Left arm       Pain Score       12/24/23 1921       No Pain           Vitals:    12/24/23 1802 12/24/23 1921 12/24/23 1930   BP: 160/87 133/74 124/68   Pulse: (!) 108 96 91   Patient Position - Orthostatic VS: Sitting Lying Lying         Visual Acuity      ED Medications  Medications   ipratropium-albuterol (DUO-NEB) 0.5-2.5 mg/3 mL inhalation solution 3 mL (3 mL Nebulization Given 12/24/23 1824)   methylPREDNISolone sodium succinate (Solu-MEDROL) injection 125 mg (125 mg Intravenous Given 12/24/23 1824)   potassium chloride (K-DUR,KLOR-CON) CR tablet 40 mEq (40 mEq Oral Given 12/24/23 1918)   oseltamivir (TAMIFLU) capsule 75 mg (75 mg Oral Given 12/24/23 1958)       Diagnostic Studies  Results Reviewed       Procedure Component Value Units Date/Time    FLU/RSV/COVID - if FLU/RSV clinically relevant [[id number]]  (Abnormal) Collected: 12/24/23 1817    Lab Status: Final result Specimen: Nares from Nose Updated: 12/24/23 1903     SARS-CoV-2 Negative     INFLUENZA A PCR Positive     INFLUENZA B PCR Negative     RSV PCR Negative    Narrative:      FOR PEDIATRIC PATIENTS - copy/paste COVID Guidelines URL to browser: [email or web address]    SARS-CoV-2 assay is a Nucleic Acid Amplification assay intended for the  qualitative detection of nucleic acid from SARS-CoV-2 in nasopharyngeal  swabs. Results are for the presumptive identification of SARS-CoV-2 RNA.    Positive results are indicative of infection with SARS-CoV-2, the virus  causing COVID-19, but do not rule out  bacterial infection or co-infection  with other viruses. Laboratories within the [place] and its  territories are required to report all positive results to the appropriate  public health authorities. Negative results do not preclude SARS-CoV-2  infection and should not be used as the sole basis for treatment or other  patient management decisions. Negative results must be combined with  clinical observations, patient history, and epidemiological information.  This test has not been FDA cleared or approved.    This test has been authorized by FDA under an Emergency Use Authorization  (EUA). This test is only authorized for the duration of time the  declaration that circumstances exist justifying the authorization of the  emergency use of an in vitro diagnostic tests for detection of SARS-CoV-2  virus and/or diagnosis of COVID-19 infection under section 564(b)(1) of  the Act, 21 U.S.C. 360bbb-3(b)(1), unless the authorization is terminated  or revoked sooner. The test has been validated but independent review by FDA  and CLIA is pending.    Test performed using [employer] GeneXpert: This RT-PCR assay targets N2,  a region unique to SARS-CoV-2. A conserved region in the E-gene was chosen  for pan-Sarbecovirus detection which includes SARS-CoV-2.    According to CMS-2020-01-R, this platform meets the definition of high-throughput technology.    B-Type Natriuretic Peptide(BNP) [[id number]]  (Normal) Collected: 12/24/23 1817    Lab Status: Final result Specimen: Blood from Arm, Left Updated: 12/24/23 1853     BNP 33 pg/mL     Procalcitonin [[id number]]  (Normal) Collected: 12/24/23 1817    Lab Status: Final result Specimen: Blood from Arm, Left Updated: 12/24/23 1852     Procalcitonin 0.07 ng/ml     HS Troponin I 2hr [[id number]]     Lab Status: No result Specimen: Blood     HS Troponin 0hr (reflex protocol) [[id number]]  (Normal) Collected: 12/24/23 1817    Lab Status: Final result Specimen: Blood from Arm, Left Updated:  12/24/23 1850     hs TnI 0hr 11 ng/L     Comprehensive metabolic panel [[id number]]  (Abnormal) Collected: 12/24/23 1817    Lab Status: Final result Specimen: Blood from Arm, Left Updated: 12/24/23 1846     Sodium 135 mmol/L      Potassium 3.3 mmol/L      Chloride 100 mmol/L      CO2 27 mmol/L      ANION GAP 8 mmol/L      BUN 14 mg/dL      Creatinine 0.83 mg/dL      Glucose 130 mg/dL      Calcium 8.9 mg/dL      AST 24 U/L      ALT 40 U/L      Alkaline Phosphatase 47 U/L      Total Protein 7.0 g/dL      Albumin 4.3 g/dL      Total Bilirubin 0.55 mg/dL      eGFR 105 ml/min/1.73sq m     Narrative:      National Kidney Disease Foundation guidelines for Chronic Kidney Disease (CKD):     Stage 1 with normal or high GFR (GFR > 90 mL/min/1.73 square meters)    Stage 2 Mild CKD (GFR = 60-89 mL/min/1.73 square meters)    Stage 3A Moderate CKD (GFR = 45-59 mL/min/1.73 square meters)    Stage 3B Moderate CKD (GFR = 30-44 mL/min/1.73 square meters)    Stage 4 Severe CKD (GFR = 15-29 mL/min/1.73 square meters)    Stage 5 End Stage CKD (GFR <15 mL/min/1.73 square meters)  Note: GFR calculation is accurate only with a steady state creatinine    Lactic acid [[id number]]  (Normal) Collected: 12/24/23 1817    Lab Status: Final result Specimen: Blood from Arm, Left Updated: 12/24/23 1845     LACTIC ACID 1.3 mmol/L     Narrative:      Result may be elevated if tourniquet was used during collection.    Protime-INR [[id number]]  (Normal) Collected: 12/24/23 1817    Lab Status: Final result Specimen: Blood from Arm, Left Updated: 12/24/23 1836     Protime 14.3 seconds      INR 1.12    APTT [[id number]]  (Normal) Collected: 12/24/23 1817    Lab Status: Final result Specimen: Blood from Arm, Left Updated: 12/24/23 1836     PTT 31 seconds     D-dimer, quantitative [[id number]]  (Normal) Collected: 12/24/23 1817    Lab Status: Final result Specimen: Blood from Arm, Left Updated: 12/24/23 6369     D-Dimer, Quant 0.31 ug/ml FEU     Blood culture #2  [[id number]] Collected: 12/24/23 1828    Lab Status: In process Specimen: Blood from Hand, Right Updated: 12/24/23 1834    CBC and differential [[id number]]  (Abnormal) Collected: 12/24/23 1817    Lab Status: Final result Specimen: Blood from Arm, Left Updated: 12/24/23 1829     WBC 7.84 Thousand/uL      RBC 4.73 Million/uL      Hemoglobin 15.4 g/dL      Hematocrit 45.3 %      MCV 96 fL      MCH 32.6 pg      MCHC 34.0 g/dL      RDW 12.4 %      MPV 10.4 fL      Platelets 133 Thousands/uL      nRBC 0 /100 WBCs      Neutrophils Relative 83 %      Immat GRANS % 0 %      Lymphocytes Relative 8 %      Monocytes Relative 9 %      Eosinophils Relative 0 %      Basophils Relative 0 %      Neutrophils Absolute 6.51 Thousands/µL      Immature Grans Absolute 0.02 Thousand/uL      Lymphocytes Absolute 0.59 Thousands/µL      Monocytes Absolute 0.68 Thousand/µL      Eosinophils Absolute 0.02 Thousand/µL      Basophils Absolute 0.02 Thousands/µL     Blood culture #1 [[id number]] Collected: 12/24/23 1817    Lab Status: In process Specimen: Blood from Arm, Left Updated: 12/24/23 1823    UA w Reflex to Microscopic w Reflex to Culture [[id number]]     Lab Status: No result Specimen: Urine                    XR chest 2 views    (Results Pending)              Procedures  ECG 12 Lead Documentation Only    Date/Time: 12/24/2023 6:24 PM    Performed by: Jermaine Wall PA-C  Authorized by: Jermaine Wall PA-C    Indications / Diagnosis:  Shortness of Breath  Patient location:  ED  Previous ECG:     Previous ECG:  Unavailable  Interpretation:     Interpretation: non-specific    Rate:     ECG rate:  97    ECG rate assessment: normal    Rhythm:     Rhythm: sinus rhythm    Ectopy:     Ectopy: none    QRS:     QRS axis:  Left  Conduction:     Conduction: normal    ST segments:     ST segments:  Normal  T waves:     T waves: non-specific    Comments:      Normal sinus rhythm with a rate of 97.  Left axis deviation.   "Incomplete right bundle branch block.  No ectopy.           ED Course  ED Course as of 12/24/23 2000   Sun Dec 24, 2023   1837 D-Dimer, Quant: 0.31   1906 INFLU A PCR(!): Positive                                             Medical Decision Making  48-year-old male presenting to the emergency department today for a cough.  Began yesterday.  Associated with shortness of breath.  Vital signs initially show tachycardia and borderline hypoxemia.  On physical examination, the patient has diminished breath sounds in the bilateral lung fields.  He has a negative Homans' sign bilaterally.  He has no leukocytosis.  Initial troponin is 11.  Lactic acid is 1.3.  BNP is 33.  Negative procalcitonin.  The patient tested positive for influenza A.  Mild hypokalemia which was repleted while here in the emergency department.  D-dimer is 0.31 and therefore negative.  EKG shows normal sinus rhythm with a rate of 97 with an incomplete right bundle branch block and a left axis deviation.  Chest x-ray shows no acute cardiopulmonary disease on my independent interpretation.  The patient was given Tamiflu while here in the emergency department.  On reevaluation after DuoNeb therapy and steroids, the patient remains hypoxemic between 87 and 89% and therefore was placed on 2 L nasal cannula.  Based upon new oxygen requirement, will plan for admission.  Case was discussed with Dr. Dietz who accepts the patient for observation.  Strict return precautions were given.  Recommend PCP follow-up as soon as possible. The patient and/or patient's proxy verify their understanding and agree to the plan at this time.  All questions answered to the patient and/or their proxy's satisfaction.  All labs reviewed and utilized in the medical decision making process (if labs were ordered).  Portions of the record may have been created with voice recognition software.  Occasional wrong word or \"sound a like\" substitutions may have occurred due to the inherent " limitations of voice recognition software.  Read the chart carefully and recognize, using context, where substitutions have occurred.    I reviewed prior notes.    Problems Addressed:  Acute hypoxemic respiratory failure (HCC): undiagnosed new problem with uncertain prognosis  Hypokalemia: undiagnosed new problem with uncertain prognosis  Influenza A: undiagnosed new problem with uncertain prognosis    Amount and/or Complexity of Data Reviewed  External Data Reviewed: notes.  Labs: ordered. Decision-making details documented in ED Course.  Radiology: ordered and independent interpretation performed. Decision-making details documented in ED Course.     Details: CXR  ECG/medicine tests: ordered and independent interpretation performed. Decision-making details documented in ED Course.  Discussion of management or test interpretation with external provider(s): Dr. Dietz - [facility] [place]  Prescription drug management.  Decision regarding hospitalization.             Disposition  Final diagnoses:   Influenza A   Acute hypoxemic respiratory failure (HCC)   Hypokalemia     Time reflects when diagnosis was documented in both MDM as applicable and the Disposition within this note       Time User Action Codes Description Comment    12/24/2023  7:32 PM Jermaine Wall [J10.1] Influenza A     12/24/2023  7:32 PM Jermaine Wall Add [J96.01] Acute hypoxemic respiratory failure (HCC)     12/24/2023  7:32 PM Jermaine Wall [E87.6] Hypokalemia           ED Disposition       ED Disposition   Admit    Condition   Stable    Date/Time   Sun Dec 24, 2023  7:33 PM    Comment   Case was discussed with Dr. Dietz and the patient's admission status was agreed to be Admission Status: observation status to the service of Dr. Dietz.               Follow-up Information    None         Patient's Medications   Discharge Prescriptions    No medications on file       No discharge procedures on file.    PDMP Review        None            ED Provider  Electronically Signed by             Jermaine Wall PA-C  12/24/23 2010

## 2023-12-25 NOTE — RESPIRATORY THERAPY NOTE
Home Oxygen Qualifying Test     Patient name: Keny Padilla        : 1975   Date of Test:  2023  Diagnosis:    Home Oxygen Test:    **Medicare Guidelines require item(s) 1-5 on all ambulatory patients or 1 and 2 on non-ambulatory patients.    1. Baseline SPO2 on Room Air at rest 92 %   If <= 88% on Room Air add O2 via NC to obtain SpO2 >=88%. If LPM needed, document LPM  needed to reach =>88%    SPO2 during exertion on Room Air 86  %  During exertion monitor SPO2. If SPO2 increases >=89%, do not add supplemental oxygen    SPO2 on Oxygen at Rest  % at  LPM    SPO2 during exertion on Oxygen 91 % at 1.5 LPM    Test performed during exertion activity.  Ambulation     [x]  Supplemental Home Oxygen is indicated.    []  Client does not qualify for home oxygen.    Respiratory Additional Notes-     Patient received on room air saturating at 92%.  Testing explained and started.  Patient was able to ambulate unassisted a total distance of 460 feet.  He desaturated to 86% and was titrated to 1.5L to maintain >/= 90%.  No S/S of distress noted during ambulation.    Supplemental oxygen with exertion at 1.5L via NC    RT [place]

## 2023-12-25 NOTE — RESPIRATORY THERAPY NOTE
RT Protocol Note  Keny Padilla 48 y.o. male MRN: [id number]  Unit/Bed#: -01 Encounter: [id number]    Assessment    Principal Problem:    Acute respiratory failure with hypoxia (HCC)  Active Problems:    Essential hypertension    Tobacco use    Moderate persistent allergic asthma    Morbid obesity due to excess calories (HCC)    Influenza    Viral sepsis       Home Pulmonary Medications:  Q6prn Albuteral   Q4 Prn Albuteral HFA     Past Medical History:   Diagnosis Date    Asthma     Hypertension               12/24/23 2102   Respiratory Assessment   Assessment Type Pre-treatment   General Appearance Alert;Awake   Respiratory Pattern Normal   Chest Assessment Chest expansion symmetrical   Bilateral Breath Sounds Diminished   Cough Dry;Non-productive   Resp Comments Pt assessed for Respiratory Protocol.  BS diminished, SPO2 92% on 2L NC. Pt with Asthma History, takes PRN Albuteral. Will continue with Xopenex TID at this time.   O2 Device 2L   Oxygen Therapy/Pulse Ox   O2 Device Nasal cannula   O2 Therapy Oxygen   Nasal Cannula O2 Flow Rate (L/min) 2 L/min   Calculated FIO2 (%) - Nasal Cannula 28   SpO2 92 %   SpO2 Activity At Rest   $ Pulse Oximetry Spot Check Charge Completed              Objective    Physical Exam:   Assessment Type: Pre-treatment  General Appearance: Alert, Awake  Respiratory Pattern: Normal  Chest Assessment: Chest expansion symmetrical  Bilateral Breath Sounds: Diminished  Cough: Dry, Non-productive  O2 Device: 2L    Vitals:  Blood pressure 131/51, pulse 86, temperature 100.3 °F (37.9 °C), temperature source Oral, resp. rate 18, height 6' (1.829 m), weight (!) 137 kg (301 lb 3.2 oz), SpO2 92%.          Imaging and other studies: I have personally reviewed pertinent reports.      O2 Device: 2L     Plan    Respiratory Plan: Home Bronchodilator Patient pathway  Airway Clearance Plan: Incentive Spirometer     Resp Comments: Pt assessed for Respiratory Protocol.  BS diminished, SPO2 92% on 2L  NC. Pt with Asthma History, takes PRN Albuteral. Will continue with Xopenex TID at this time.

## 2023-12-25 NOTE — PLAN OF CARE
Problem: INFECTION - ADULT  Goal: Absence or prevention of progression during hospitalization  Description: INTERVENTIONS:  - Assess and monitor for signs and symptoms of infection  - Monitor lab/diagnostic results  - Monitor all insertion sites, i.e. indwelling lines, tubes, and drains  - Monitor endotracheal if appropriate and nasal secretions for changes in amount and color  - [place] appropriate cooling/warming therapies per order  - Administer medications as ordered  - Instruct and encourage patient and family to use good hand hygiene technique  - Identify and instruct in appropriate isolation precautions for identified infection/condition  Outcome: Progressing

## 2023-12-25 NOTE — ASSESSMENT & PLAN NOTE
Patient with no recent influenza vaccination.  Smoker currently.  History of asthma.  Presented with acute hypoxic respiratory failure.    See plan as above.

## 2023-12-25 NOTE — ASSESSMENT & PLAN NOTE
On albuterol at home will change to Xopenex nebulizers and albuterol inhaler as needed while inpatient.

## 2023-12-25 NOTE — CASE MANAGEMENT
Case Management Discharge Planning Note    Patient name Keny Padilla  Location /-01 MRN [id number]  : 1975 Date 2023       Current Admission Date: 2023  Current Admission Diagnosis:Acute respiratory failure with hypoxia (HCC)   Patient Active Problem List    Diagnosis Date Noted    Influenza 2023    Acute respiratory failure with hypoxia (HCC) 2023    Viral sepsis  2023    Skin tags, multiple acquired 08/10/2022    Encounter for annual physical exam 2021    Moderate persistent allergic asthma 2021    Morbid obesity due to excess calories (HCC) 2021    Essential hypertension 2020    Tobacco use 2020    Polycythemia 2020    Elevated liver enzymes 2020      LOS (days): 0  Geometric Mean LOS (GMLOS) (days):   Days to GMLOS:     OBJECTIVE:            Current admission status: Observation   Preferred Pharmacy:   [facility] [address] [place] [id number]  Phone: [phone] Fax: [phone]    Primary Care Provider: Santi Echevarria MD    Primary Insurance: [employer]  Secondary Insurance:     DISCHARGE DETAILS:    Discharge planning discussed with:: Patient  Freedom of Choice: Yes  Comments - Freedom of Choice: Choice is for [employer] for DME supplier  CM contacted family/caregiver?: No- see comments (declining call at this time)  Were Treatment Team discharge recommendations reviewed with patient/caregiver?: Yes  Did patient/caregiver verbalize understanding of patient care needs?: Yes  Were patient/caregiver advised of the risks associated with not following Treatment Team discharge recommendations?: Yes         Requested Home Health Care         Is the patient interested in HHC at discharge?: No    DME Referral Provided  Referral made for DME?: Yes  DME referral completed for the following items:: Portable Oxygen tanks, Home Oxygen concentrator  DME Supplier  Name:: [employer]    Other Referral/Resources/Interventions Provided:  Interventions: DME    Would you like to participate in our [facility] service program?  : No - Declined    Treatment Team Recommendation: Home  Discharge Destination Plan:: Home  Transport at Discharge : Self     Additional Comments: . Per provider, patient cleared for discharge. Patient needing home O2 for D/C home. Per RT documentation, requiring 1.5L with exertion. Patient appropriate for CHANEL POC. Order placed in parachute. Provider made aware patient is able to D/C home with CHANEL POC. Contact information for [email or web address] listed on AVS.

## 2023-12-25 NOTE — ASSESSMENT & PLAN NOTE
Highly encouraged weight loss with diet and exercise modification.   Taylor Casper called and said cristal's arthritis pain is so bad of the knees, wrists, shoulder, hips and hand stiffness  Her pain level is 12/10  He is asking if there is anything stronger you can prescribe that is stronger than the Meloxicam   They are trying to prepare for the holiday weekend but she is in so much pain        [facility] [place]

## 2023-12-26 LAB
ATRIAL RATE: 94 BPM
ATRIAL RATE: 97 BPM
P AXIS: 65 DEGREES
P AXIS: 70 DEGREES
PR INTERVAL: 146 MS
PR INTERVAL: 148 MS
QRS AXIS: -75 DEGREES
QRS AXIS: -75 DEGREES
QRSD INTERVAL: 106 MS
QRSD INTERVAL: 106 MS
QT INTERVAL: 334 MS
QT INTERVAL: 336 MS
QTC INTERVAL: 417 MS
QTC INTERVAL: 426 MS
T WAVE AXIS: 48 DEGREES
T WAVE AXIS: 52 DEGREES
VENTRICULAR RATE: 94 BPM
VENTRICULAR RATE: 97 BPM

## 2023-12-27 ENCOUNTER — TRANSITIONAL CARE MANAGEMENT (OUTPATIENT)
Dept: FAMILY MEDICINE CLINIC | Facility: CLINIC | Age: 48
End: 2023-12-27

## 2023-12-30 LAB
BACTERIA BLD CULT: NORMAL
BACTERIA BLD CULT: NORMAL

## 2024-01-03 ENCOUNTER — OFFICE VISIT (OUTPATIENT)
Dept: FAMILY MEDICINE CLINIC | Facility: CLINIC | Age: 49
End: 2024-01-03
Payer: COMMERCIAL

## 2024-01-03 ENCOUNTER — TRANSITIONAL CARE MANAGEMENT (OUTPATIENT)
Dept: FAMILY MEDICINE CLINIC | Facility: CLINIC | Age: 49
End: 2024-01-03

## 2024-01-03 VITALS
DIASTOLIC BLOOD PRESSURE: 84 MMHG | WEIGHT: 302 LBS | TEMPERATURE: 97.9 F | HEART RATE: 72 BPM | BODY MASS INDEX: 40.9 KG/M2 | HEIGHT: 72 IN | OXYGEN SATURATION: 92 % | SYSTOLIC BLOOD PRESSURE: 130 MMHG | RESPIRATION RATE: 20 BRPM

## 2024-01-03 DIAGNOSIS — J96.01 ACUTE RESPIRATORY FAILURE WITH HYPOXIA (HCC): ICD-10-CM

## 2024-01-03 DIAGNOSIS — I10 ESSENTIAL HYPERTENSION: ICD-10-CM

## 2024-01-03 DIAGNOSIS — J11.1 INFLUENZA: Primary | ICD-10-CM

## 2024-01-03 DIAGNOSIS — Z72.0 TOBACCO USE: ICD-10-CM

## 2024-01-03 DIAGNOSIS — J45.40 MODERATE PERSISTENT ALLERGIC ASTHMA: ICD-10-CM

## 2024-01-03 DIAGNOSIS — E66.01 MORBID OBESITY DUE TO EXCESS CALORIES (HCC): ICD-10-CM

## 2024-01-03 PROBLEM — B97.89 VIRAL SEPSIS: Status: RESOLVED | Noted: 2023-12-24 | Resolved: 2024-01-03

## 2024-01-03 PROBLEM — A41.89 VIRAL SEPSIS: Status: RESOLVED | Noted: 2023-12-24 | Resolved: 2024-01-03

## 2024-01-03 PROBLEM — L91.8 SKIN TAGS, MULTIPLE ACQUIRED: Status: RESOLVED | Noted: 2022-08-10 | Resolved: 2024-01-03

## 2024-01-03 PROCEDURE — 99495 TRANSJ CARE MGMT MOD F2F 14D: CPT | Performed by: FAMILY MEDICINE

## 2024-01-03 RX ORDER — IRBESARTAN 150 MG/1
150 TABLET ORAL DAILY
Qty: 30 TABLET | Refills: 6 | Status: SHIPPED | OUTPATIENT
Start: 2024-01-03

## 2024-01-03 RX ORDER — ALBUTEROL SULFATE 90 UG/1
2 AEROSOL, METERED RESPIRATORY (INHALATION) EVERY 4 HOURS PRN
Qty: 18 G | Refills: 3 | Status: SHIPPED | OUTPATIENT
Start: 2024-01-03

## 2024-01-03 RX ORDER — ALBUTEROL SULFATE 1.25 MG/3ML
1.25 SOLUTION RESPIRATORY (INHALATION) EVERY 6 HOURS PRN
Qty: 75 ML | Refills: 0 | Status: SHIPPED | OUTPATIENT
Start: 2024-01-03

## 2024-01-03 NOTE — ASSESSMENT & PLAN NOTE
BP good. Continue irbesartan 150 mg qd. Pt advised to continue low Na diet and to exercise on a regular basis.

## 2024-01-03 NOTE — PROGRESS NOTES
Assessment/Plan:         Problem List Items Addressed This Visit        Respiratory    Moderate persistent allergic asthma     Breathing better now. Continue home nebs or rescue inhaler as needed. Patient again advised to quit smoking.          Relevant Medications    albuterol (PROVENTIL HFA,VENTOLIN HFA) 90 mcg/act inhaler    albuterol (ACCUNEB) 1.25 MG/3ML nebulizer solution    Influenza - Primary     Patient was admitted from 12/24 to 12/25 and tested positive for Influenza A. Patient had cough, shortness of breath, and hypoxia. Patient was treated with nebs, IV steroids, and tamiflu. Patient improved and discharged with steroids and tamiflu. Doing better now but still has cough and chest tightness at times. Patient to take mucinex DM twice a day and use rescue inhaler as needed. Will RTW tomorrow.          Acute respiratory failure with hypoxia (HCC)     O2 sat was 88% in hospital and patient placed on O2 by nasal canula. O2 saturation normal on room air today.             Cardiovascular and Mediastinum    Essential hypertension     BP good. Continue irbesartan 150 mg qd. Pt advised to continue low Na diet and to exercise on a regular basis.          Relevant Medications    irbesartan (AVAPRO) 150 mg tablet       Other    Tobacco use     Pt smokes 1.5 PPD. Discussed quitting.         Morbid obesity due to excess calories (HCC)     Discussed smaller portions, healthy snacks, and regular exercise.          TCM Call     Date and time call was made  1/3/2024 10:09 AM    Hospital care reviewed  Discussed with Inpatient Physician    Patient was hospitialized at  [facility]    Date of Admission  12/24/23    Date of discharge  12/25/23    Diagnosis  flu    Disposition  Home    Current Symptoms  Cough    Cough Severity  Mild      TCM Call     Post hospital issues  None    Should patient be enrolled in anticoag monitoring?  No    Scheduled for follow up?  Yes    Patients specialists  [facility]    Did you  obtain your prescribed medications  Yes    Do you need help managing your prescriptions or medications  No    Is transportation to your appointment needed  No    I have advised the patient to call PCP with any new or worsening symptoms  Minerva Art CMA            Depression Screening and Follow-up Plan: Patient was screened for depression during today's encounter. They screened negative with a PHQ-2 score of 0.            Subjective:      Patient ID: Keny Padilla is a 48 y.o. male.    Patient here for follow-up hospital stay for influenza with hypoxia. Patient was admitted from 12/24 to 12/25. Oxygen sat was 88% on admission and patient was placed on O2 at 2L. Patient was given tamiflu and IV steroids. Patient also given nebs and was doing better. Sent home with tamiflu and prednisone 20 mg twice a day for 5 days. No fever. Still has cough and chest tightness.         The following portions of the patient's history were reviewed and updated as appropriate:   Past Medical History:  He has a past medical history of Asthma and Hypertension.,  _______________________________________________________________________  Medical Problems:  does not have any pertinent problems on file.,  _______________________________________________________________________  Past Surgical History:   has a past surgical history that includes [place] tooth extraction.,  _______________________________________________________________________  Family History:  family history includes COPD in his sister; Diabetes in his sister; Lung cancer in his father.,  _______________________________________________________________________  Social History:   reports that he has been smoking cigarettes. He started smoking about 25 years ago. He has a 37.5 pack-year smoking history. He has never used smokeless tobacco. He reports that he does not currently use alcohol. He reports that he does not use  drugs.,  _______________________________________________________________________  Allergies:  has No Known Allergies..  _______________________________________________________________________  Current Outpatient Medications   Medication Sig Dispense Refill   • albuterol (ACCUNEB) 1.25 MG/3ML nebulizer solution Take 3 mL (1.25 mg total) by nebulization every 6 (six) hours as needed for wheezing 75 mL 0   • albuterol (PROVENTIL HFA,VENTOLIN HFA) 90 mcg/act inhaler Inhale 2 puffs every 4 (four) hours as needed for wheezing or shortness of breath 18 g 3   • irbesartan (AVAPRO) 150 mg tablet Take 1 tablet (150 mg total) by mouth daily 30 tablet 6     No current facility-administered medications for this visit.     _______________________________________________________________________  Review of Systems   Constitutional:  Negative for chills, fatigue, fever and unexpected weight change.   HENT:  Negative for congestion, ear pain, postnasal drip, rhinorrhea, sinus pressure, sinus pain, sore throat and trouble swallowing.    Respiratory:  Positive for cough and chest tightness. Negative for shortness of breath and wheezing.    Cardiovascular:  Negative for chest pain.   Gastrointestinal:  Negative for abdominal pain, constipation, diarrhea, nausea and vomiting.   Musculoskeletal:  Negative for arthralgias.   Skin:  Negative for rash.   Neurological:  Negative for dizziness and headaches.   Psychiatric/Behavioral:  Negative for dysphoric mood. The patient is not nervous/anxious.          Objective:  Vitals:    01/03/24 0925   BP: 130/84   BP Location: Left arm   Patient Position: Sitting   Cuff Size: Large   Pulse: 72   Resp: 20   Temp: 97.9 °F (36.6 °C)   TempSrc: Tympanic   SpO2: 92%   Weight: (!) 137 kg (302 lb)   Height: 6' (1.829 m)     Body mass index is 40.96 kg/m².     Physical Exam  Vitals and nursing note reviewed.   Constitutional:       Appearance: He is well-developed. He is obese. He is not ill-appearing or  toxic-appearing.   HENT:      Right Ear: Tympanic membrane and ear canal normal.      Left Ear: Tympanic membrane and ear canal normal.      Nose: Nose normal. No congestion or rhinorrhea.      Mouth/Throat:      Mouth: Mucous membranes are moist. No oral lesions.      Pharynx: Oropharynx is clear. Uvula midline. No oropharyngeal exudate, posterior oropharyngeal erythema or uvula swelling.      Tonsils: No tonsillar exudate.   Cardiovascular:      Rate and Rhythm: Normal rate and regular rhythm.      Heart sounds: Normal heart sounds. No murmur heard.  Pulmonary:      Effort: Pulmonary effort is normal. No respiratory distress.      Breath sounds: Normal breath sounds. No wheezing or rhonchi.   Musculoskeletal:      Cervical back: Normal range of motion and neck supple.   Lymphadenopathy:      Cervical: No cervical adenopathy.

## 2024-01-03 NOTE — ASSESSMENT & PLAN NOTE
Breathing better now. Continue home nebs or rescue inhaler as needed. Patient again advised to quit smoking.

## 2024-01-03 NOTE — ASSESSMENT & PLAN NOTE
O2 sat was 88% in hospital and patient placed on O2 by nasal canula. O2 saturation normal on room air today.

## 2024-01-03 NOTE — ASSESSMENT & PLAN NOTE
Patient was admitted from 12/24 to 12/25 and tested positive for Influenza A. Patient had cough, shortness of breath, and hypoxia. Patient was treated with nebs, IV steroids, and tamiflu. Patient improved and discharged with steroids and tamiflu. Doing better now but still has cough and chest tightness at times. Patient to take mucinex DM twice a day and use rescue inhaler as needed. Will RTW tomorrow.

## 2024-02-21 PROBLEM — J11.1 INFLUENZA: Status: RESOLVED | Noted: 2023-12-24 | Resolved: 2024-02-21

## 2024-03-06 DIAGNOSIS — I10 ESSENTIAL HYPERTENSION: ICD-10-CM

## 2024-03-07 RX ORDER — IRBESARTAN 150 MG/1
150 TABLET ORAL DAILY
Qty: 90 TABLET | Refills: 1 | Status: SHIPPED | OUTPATIENT
Start: 2024-03-07

## 2024-09-09 DIAGNOSIS — I10 ESSENTIAL HYPERTENSION: ICD-10-CM

## 2024-09-10 RX ORDER — IRBESARTAN 150 MG/1
150 TABLET ORAL DAILY
Qty: 90 TABLET | Refills: 0 | Status: SHIPPED | OUTPATIENT
Start: 2024-09-10

## 2024-09-12 ENCOUNTER — APPOINTMENT (OUTPATIENT)
Dept: URGENT CARE | Age: 49
End: 2024-09-12

## 2024-12-03 DIAGNOSIS — I10 ESSENTIAL HYPERTENSION: ICD-10-CM

## 2024-12-04 RX ORDER — IRBESARTAN 150 MG/1
150 TABLET ORAL DAILY
Qty: 90 TABLET | Refills: 0 | Status: SHIPPED | OUTPATIENT
Start: 2024-12-04

## 2024-12-18 ENCOUNTER — VBI (OUTPATIENT)
Dept: ADMINISTRATIVE | Facility: OTHER | Age: 49
End: 2024-12-18

## 2024-12-18 NOTE — TELEPHONE ENCOUNTER
12/18/24 7:07 AM     Chart reviewed for CRC: Colonoscopy ; nothing is submitted to the patient's insurance at this time.     Michelle Mayer MA   [facility]

## 2025-01-17 ENCOUNTER — OFFICE VISIT (OUTPATIENT)
Dept: FAMILY MEDICINE CLINIC | Facility: CLINIC | Age: 50
End: 2025-01-17
Payer: COMMERCIAL

## 2025-01-17 ENCOUNTER — TRANSCRIBE ORDERS (OUTPATIENT)
Dept: SLEEP CENTER | Facility: CLINIC | Age: 50
End: 2025-01-17

## 2025-01-17 VITALS
WEIGHT: 315 LBS | HEIGHT: 72 IN | RESPIRATION RATE: 20 BRPM | SYSTOLIC BLOOD PRESSURE: 126 MMHG | DIASTOLIC BLOOD PRESSURE: 84 MMHG | OXYGEN SATURATION: 93 % | HEART RATE: 82 BPM | BODY MASS INDEX: 42.66 KG/M2

## 2025-01-17 DIAGNOSIS — Z12.11 SCREENING FOR COLON CANCER: ICD-10-CM

## 2025-01-17 DIAGNOSIS — E66.01 MORBID OBESITY DUE TO EXCESS CALORIES (HCC): Primary | ICD-10-CM

## 2025-01-17 DIAGNOSIS — I10 ESSENTIAL HYPERTENSION: Primary | ICD-10-CM

## 2025-01-17 DIAGNOSIS — R53.83 FATIGUE, UNSPECIFIED TYPE: ICD-10-CM

## 2025-01-17 DIAGNOSIS — J45.40 MODERATE PERSISTENT ALLERGIC ASTHMA: ICD-10-CM

## 2025-01-17 DIAGNOSIS — R06.83 LOUD SNORING: ICD-10-CM

## 2025-01-17 DIAGNOSIS — R29.818 SUSPECTED SLEEP APNEA: ICD-10-CM

## 2025-01-17 DIAGNOSIS — E66.01 MORBID OBESITY DUE TO EXCESS CALORIES (HCC): ICD-10-CM

## 2025-01-17 DIAGNOSIS — Z00.00 ENCOUNTER FOR ANNUAL PHYSICAL EXAM: ICD-10-CM

## 2025-01-17 DIAGNOSIS — Z72.0 TOBACCO USE: ICD-10-CM

## 2025-01-17 PROBLEM — J96.01 ACUTE RESPIRATORY FAILURE WITH HYPOXIA (HCC): Status: RESOLVED | Noted: 2023-12-24 | Resolved: 2025-01-17

## 2025-01-17 PROCEDURE — 99396 PREV VISIT EST AGE 40-64: CPT | Performed by: FAMILY MEDICINE

## 2025-01-17 PROCEDURE — 99214 OFFICE O/P EST MOD 30 MIN: CPT | Performed by: FAMILY MEDICINE

## 2025-01-17 NOTE — ASSESSMENT & PLAN NOTE
Blood pressure good. Continue irbesartan 150 mg qd. Pt advised to continue low Na diet and to exercise on a regular basis. Will check labs.   Orders:  •  Lipid panel; Future  •  Comprehensive metabolic panel; Future  •  CBC and differential; Future  •  UA (URINE) with reflex to Scope; Future

## 2025-01-17 NOTE — ASSESSMENT & PLAN NOTE
CPE done. Had Tdap 5 years ago per patient. Recommend flu shot. Will check labs. Discussed screening for Colon Cancer. Pt advised to follow a well balanced, heart healthy diet and to exercise on a regular basis.

## 2025-01-17 NOTE — ASSESSMENT & PLAN NOTE
Stable. Continue home nebs or rescue inhaler as needed. Patient again advised to quit smoking.

## 2025-01-17 NOTE — ASSESSMENT & PLAN NOTE
Discussed smaller portions, healthy snacks, and regular exercise.  Orders:  •  Ambulatory Referral to Sleep Medicine; Future

## 2025-01-17 NOTE — PROGRESS NOTES
Name: Keny Padilla      : 1975      MRN: [id number]  Encounter Provider: Santi Echevarria MD  Encounter Date: 2025   Encounter department: [facility]  :  Assessment & Plan  Encounter for annual physical exam  CPE done. Had Tdap 5 years ago per patient. Recommend flu shot. Will check labs. Discussed screening for Colon Cancer. Pt advised to follow a well balanced, heart healthy diet and to exercise on a regular basis.          Screening for colon cancer    Orders:  •  Cologuard    Essential hypertension  Blood pressure good. Continue irbesartan 150 mg qd. Pt advised to continue low Na diet and to exercise on a regular basis. Will check labs.   Orders:  •  Lipid panel; Future  •  Comprehensive metabolic panel; Future  •  CBC and differential; Future  •  UA (URINE) with reflex to Scope; Future    Moderate persistent allergic asthma  Stable. Continue home nebs or rescue inhaler as needed. Patient again advised to quit smoking.        Tobacco use  No change. Patient still smokes 1.5 PPD. Discussed quitting.       Morbid obesity due to excess calories (HCC)  Discussed smaller portions, healthy snacks, and regular exercise.  Orders:  •  Ambulatory Referral to Sleep Medicine; Future    Suspected sleep apnea  Will check home study for CDL..   Orders:  •  Ambulatory Referral to Sleep Medicine; Future    Loud snoring  Will check home study.   Orders:  •  Ambulatory Referral to Sleep Medicine; Future    Fatigue, unspecified type  Will check labs and home study.   Orders:  •  Ambulatory Referral to Sleep Medicine; Future          Depression Screening and Follow-up Plan: Patient was screened for depression during today's encounter. They screened negative with a PHQ-2 score of 0.      History of Present Illness     Patient here for physical and follow-up Hypertension, Asthma, Tobacco use. Patient doing ok. No chest pain or shortness of breath. No headaches. No abdominal pain. Patient still  smokes 1.5 packs per day. Has ETOH on weekends. Uses rescue inhaler as needed. Not checking blood pressure at home. No regular exercise. Follows low Na diet. Last Tdap was 5 years ago. Doesn't get flu shot or COVID vaccines. 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 Patient snores and feels tired during the day.       Review of Systems   Constitutional:  Positive for fatigue. Negative for activity change, appetite change and unexpected weight change.   HENT:  Negative for congestion, ear pain, rhinorrhea, sore throat and trouble swallowing.    Eyes:  Negative for pain, discharge and visual disturbance.   Respiratory:  Negative for cough, shortness of breath and wheezing.    Cardiovascular:  Negative for chest pain, palpitations and leg swelling.   Gastrointestinal:  Negative for abdominal pain, constipation, diarrhea, nausea and vomiting.   Endocrine: Negative for polydipsia, polyphagia and polyuria.   Genitourinary:  Negative for difficulty urinating and hematuria.   Musculoskeletal:  Negative for arthralgias, back pain, gait problem, myalgias and neck pain.   Skin:  Negative for color change and rash.   Neurological:  Negative for dizziness, weakness and headaches.   Hematological:  Negative for adenopathy. Does not bruise/bleed easily.   Psychiatric/Behavioral:  Negative for dysphoric mood and sleep disturbance. The patient is not nervous/anxious.        Objective   /84 (BP Location: Left arm, Patient Position: Sitting, Cuff Size: Large)   Pulse 82   Resp 20   Ht 6' (1.829 m)   Wt (!) 149 kg (329 lb)   SpO2 93%   BMI 44.62 kg/m²      Physical Exam  Vitals and nursing note reviewed.   Constitutional:       General: He is not in acute distress.     Appearance: Normal appearance. He is well-developed. He is obese. He is not ill-appearing.   HENT:      Head: Normocephalic and atraumatic.      Right Ear: Tympanic membrane, ear canal and external ear normal.      Left Ear: Tympanic  membrane, ear canal and external ear normal.      Nose: Nose normal. No congestion or rhinorrhea.      Mouth/Throat:      Mouth: Mucous membranes are moist.      Pharynx: Oropharynx is clear. No posterior oropharyngeal erythema.   Eyes:      General:         Right eye: No discharge.      Conjunctiva/sclera: Conjunctivae normal.      Pupils: Pupils are equal, round, and reactive to light.   Neck:      Thyroid: No thyromegaly.   Cardiovascular:      Rate and Rhythm: Normal rate and regular rhythm.      Heart sounds: No murmur heard.  Pulmonary:      Effort: Pulmonary effort is normal. No respiratory distress.      Breath sounds: Normal breath sounds. No wheezing.   Abdominal:      General: Bowel sounds are normal.      Palpations: Abdomen is soft. There is no mass.      Tenderness: There is no abdominal tenderness.   Musculoskeletal:         General: No swelling or deformity. Normal range of motion.      Cervical back: Normal range of motion and neck supple. No tenderness.      Right lower leg: No edema.      Left lower leg: No edema.   Lymphadenopathy:      Cervical: No cervical adenopathy.   Skin:     General: Skin is warm and dry.      Capillary Refill: Capillary refill takes less than 2 seconds.      Findings: No erythema or rash.      Comments: Tattoo right shoulder   Neurological:      General: No focal deficit present.      Mental Status: He is alert and oriented to person, place, and time.      Sensory: No sensory deficit.   Psychiatric:         Mood and Affect: Mood normal.         Behavior: Behavior normal.         Thought Content: Thought content normal.         Judgment: Judgment normal.

## 2025-01-21 ENCOUNTER — TELEPHONE (OUTPATIENT)
Age: 50
End: 2025-01-21

## 2025-01-21 DIAGNOSIS — J45.40 MODERATE PERSISTENT ALLERGIC ASTHMA: ICD-10-CM

## 2025-01-21 DIAGNOSIS — J06.9 ACUTE URI: Primary | ICD-10-CM

## 2025-01-21 RX ORDER — ALBUTEROL SULFATE 1.25 MG/3ML
1.25 SOLUTION RESPIRATORY (INHALATION) EVERY 6 HOURS PRN
Qty: 75 ML | Refills: 0 | Status: SHIPPED | OUTPATIENT
Start: 2025-01-21

## 2025-01-21 RX ORDER — AMOXICILLIN 875 MG/1
875 TABLET, COATED ORAL 2 TIMES DAILY
Qty: 14 TABLET | Refills: 0 | Status: SHIPPED | OUTPATIENT
Start: 2025-01-21 | End: 2025-01-28

## 2025-01-21 NOTE — TELEPHONE ENCOUNTER
Patient called asking if PCP is willing to send an antibiotic to [facility] [address] for nasal and chest congestion started Saturday after his office visit with PCP for his physical. He states since being in the office, he feels run down. I explained normally pcp will want to see him in office, however he did not wish to schedule an appointment at this time.

## 2025-03-17 DIAGNOSIS — I10 ESSENTIAL HYPERTENSION: ICD-10-CM

## 2025-03-17 RX ORDER — IRBESARTAN 150 MG/1
150 TABLET ORAL DAILY
Qty: 90 TABLET | Refills: 0 | Status: SHIPPED | OUTPATIENT
Start: 2025-03-17

## 2025-03-17 NOTE — TELEPHONE ENCOUNTER
Reason for call:   [x] Refill   [] Prior Auth  [] Other:     Office:   [x] PCP/Provider -   [] Specialty/Provider -     Medication:     irbesartan (AVAPRO) 150 mg tablet       Dose/Frequency: Take 1 tablet (150 mg total) by mouth daily,     Quantity: 90 tablet     Pharmacy: [address]   Does the patient have enough for 3 days?   [] Yes   [x] No - Send as HP to POD

## 2025-04-03 DIAGNOSIS — J45.40 MODERATE PERSISTENT ALLERGIC ASTHMA: ICD-10-CM

## 2025-04-04 RX ORDER — ALBUTEROL SULFATE 90 UG/1
INHALANT RESPIRATORY (INHALATION)
Qty: 18 G | Refills: 3 | Status: SHIPPED | OUTPATIENT
Start: 2025-04-04

## 2025-04-04 NOTE — TELEPHONE ENCOUNTER
Refill must be reviewed and completed by the office or provider. The refill is unable to be approved or denied by the medication management team.    Albuterol as need for wheezing, last refill 01.2024 - Please review to see if the refill is appropriate.

## 2025-05-28 ENCOUNTER — VBI (OUTPATIENT)
Dept: ADMINISTRATIVE | Facility: OTHER | Age: 50
End: 2025-05-28

## 2025-05-28 NOTE — TELEPHONE ENCOUNTER
05/28/25 1:55 PM     Chart reviewed for CRC: Colonoscopy ; nothing is submitted to the patient's insurance at this time.     Michelle Mayer MA   [facility]

## 2025-06-19 DIAGNOSIS — I10 ESSENTIAL HYPERTENSION: ICD-10-CM

## 2025-06-20 RX ORDER — IRBESARTAN 150 MG/1
150 TABLET ORAL DAILY
Qty: 90 TABLET | Refills: 0 | Status: SHIPPED | OUTPATIENT
Start: 2025-06-20

## 2025-06-26 ENCOUNTER — HOSPITAL ENCOUNTER (OUTPATIENT)
Dept: SLEEP CENTER | Facility: CLINIC | Age: 50
Discharge: HOME | End: 2025-06-26
Payer: COMMERCIAL

## 2025-06-26 DIAGNOSIS — R06.83 LOUD SNORING: ICD-10-CM

## 2025-06-26 DIAGNOSIS — R53.83 FATIGUE, UNSPECIFIED TYPE: ICD-10-CM

## 2025-06-26 DIAGNOSIS — E66.01 MORBID OBESITY DUE TO EXCESS CALORIES (HCC): ICD-10-CM

## 2025-06-26 DIAGNOSIS — R29.818 SUSPECTED SLEEP APNEA: ICD-10-CM

## 2025-06-26 PROCEDURE — G0399 HOME SLEEP TEST/TYPE 3 PORTA: HCPCS

## 2025-06-26 PROCEDURE — G0399 HOME SLEEP TEST/TYPE 3 PORTA: HCPCS | Performed by: STUDENT IN AN ORGANIZED HEALTH CARE EDUCATION/TRAINING PROGRAM

## 2025-06-26 NOTE — PROGRESS NOTES
Home Sleep Study Documentation    HOME STUDY DEVICE: Noxturnal no                                           Gilma G3 yes device # 28      Pre-Sleep Home Study:    Set-up and instructions performed by: Noreen    Technician performed demonstration for Patient: yes    Return demonstration performed by Patient: yes    Written instructions provided to Patient: yes    Patient signed consent form: yes          Post-Sleep Home Study:    Post Test Questionnaire Data: Pending    Additional comments by Patient: pending    Home Sleep Study Failed:pending    Failure reason: pending    Reported or Detected: pending    Scored by: pending

## 2025-07-01 ENCOUNTER — DOCUMENTATION (OUTPATIENT)
Dept: SLEEP CENTER | Facility: CLINIC | Age: 50
End: 2025-07-01

## 2025-07-01 PROBLEM — G47.33 OSA (OBSTRUCTIVE SLEEP APNEA): Status: ACTIVE | Noted: 2025-07-01

## 2025-07-01 NOTE — PROGRESS NOTES
Patient recently completed a home sleep study which revealed mild obstructive sleep apnea.    I have messaged the ordering clinician regarding sleep study results.   Calm

## 2025-07-11 ENCOUNTER — TELEPHONE (OUTPATIENT)
Dept: FAMILY MEDICINE CLINIC | Facility: CLINIC | Age: 50
End: 2025-07-11

## 2025-07-11 DIAGNOSIS — G47.33 OBSTRUCTIVE SLEEP APNEA: Primary | ICD-10-CM

## 2025-07-11 NOTE — TELEPHONE ENCOUNTER
Left messag for pt to call office brian.              Call patient. Home study shows Obstructive Sleep Apnea. Refer to Sleep Med for management. Referral placed in chart and scheduling number [phone]

## 2025-07-14 ENCOUNTER — TELEPHONE (OUTPATIENT)
Dept: FAMILY MEDICINE CLINIC | Facility: CLINIC | Age: 50
End: 2025-07-14

## 2025-07-14 NOTE — TELEPHONE ENCOUNTER
----- Message from Santi Echevarria MD sent at 7/1/2025  3:10 PM EDT -----  Regarding: FW: Home Sleep Study Results - Mild GLENN  Call patient. Home study shows Obstructive Sleep Apnea. Refer to Sleep Med for management.  ----- Message -----  From: Mnyor Dover MD  Sent: 7/1/2025   3:07 PM EDT  To: Santi Echevarria MD; Sleep Medicine [facility]#  Subject: Home Sleep Study Results - Mild GLENN              Dr. Echevarria (CC: Clinical Sleep Pool),     Good Afternoon,    I recently reviewed this patient's home sleep study that you previously ordered.  This home sleep study did reveal clinically significant sleep disordered breathing and mild obstructive sleep apnea as the JENELLE was 13.2.    If you would like me to meet with the patient in the office to review study results, please place a sleep medicine consult order.  I would be more than happy to meet with the patient in the office or have them meet with a member of our team to discuss further.    Let me know if there are any questions or concerns, thanks!    Eliseo

## 2025-07-15 ENCOUNTER — APPOINTMENT (OUTPATIENT)
Dept: URGENT CARE | Facility: MEDICAL CENTER | Age: 50
End: 2025-07-15

## 2025-07-15 ENCOUNTER — RA CDI HCC (OUTPATIENT)
Dept: OTHER | Facility: HOSPITAL | Age: 50
End: 2025-07-15

## 2025-07-17 ENCOUNTER — TELEPHONE (OUTPATIENT)
Dept: SLEEP CENTER | Facility: CLINIC | Age: 50
End: 2025-07-17

## 2025-07-17 NOTE — TELEPHONE ENCOUNTER
Home sleep study resulted and shows mild sleep apnea with significant hypoxia. JENELLE 13.2. Oxygen saturation below 90% for 260.3 minutes.     Patient read results and has follow up appointment with Dr. Echevarria on 7/22/2025, ordering provider.     [email or web address] message sent to patient with call back number, [phone].

## 2025-07-31 PROBLEM — R53.83 FATIGUE: Status: RESOLVED | Noted: 2025-01-17 | Resolved: 2025-07-31
